# Patient Record
Sex: FEMALE | Race: WHITE | NOT HISPANIC OR LATINO
[De-identification: names, ages, dates, MRNs, and addresses within clinical notes are randomized per-mention and may not be internally consistent; named-entity substitution may affect disease eponyms.]

---

## 2017-07-27 ENCOUNTER — LABORATORY RESULT (OUTPATIENT)
Age: 82
End: 2017-07-27

## 2017-07-27 ENCOUNTER — APPOINTMENT (OUTPATIENT)
Dept: INTERNAL MEDICINE | Facility: CLINIC | Age: 82
End: 2017-07-27
Payer: MEDICARE

## 2017-07-27 VITALS
HEIGHT: 65 IN | BODY MASS INDEX: 25.99 KG/M2 | SYSTOLIC BLOOD PRESSURE: 126 MMHG | TEMPERATURE: 98.3 F | DIASTOLIC BLOOD PRESSURE: 72 MMHG | WEIGHT: 156 LBS

## 2017-07-27 VITALS — DIASTOLIC BLOOD PRESSURE: 66 MMHG | SYSTOLIC BLOOD PRESSURE: 116 MMHG

## 2017-07-27 DIAGNOSIS — Z82.3 FAMILY HISTORY OF STROKE: ICD-10-CM

## 2017-07-27 DIAGNOSIS — Z85.3 PERSONAL HISTORY OF MALIGNANT NEOPLASM OF BREAST: ICD-10-CM

## 2017-07-27 DIAGNOSIS — Z80.3 FAMILY HISTORY OF MALIGNANT NEOPLASM OF BREAST: ICD-10-CM

## 2017-07-27 DIAGNOSIS — G43.109 MIGRAINE WITH AURA, NOT INTRACTABLE, W/OUT STATUS MIGRAINOSUS: ICD-10-CM

## 2017-07-27 DIAGNOSIS — Z83.49 FAMILY HISTORY OF OTHER ENDOCRINE, NUTRITIONAL AND METABOLIC DISEASES: ICD-10-CM

## 2017-07-27 DIAGNOSIS — R41.3 OTHER AMNESIA: ICD-10-CM

## 2017-07-27 DIAGNOSIS — Z82.49 FAMILY HISTORY OF ISCHEMIC HEART DISEASE AND OTHER DISEASES OF THE CIRCULATORY SYSTEM: ICD-10-CM

## 2017-07-27 DIAGNOSIS — Z87.39 PERSONAL HISTORY OF OTHER DISEASES OF THE MUSCULOSKELETAL SYSTEM AND CONNECTIVE TISSUE: ICD-10-CM

## 2017-07-27 PROCEDURE — 99214 OFFICE O/P EST MOD 30 MIN: CPT | Mod: 25

## 2017-07-27 PROCEDURE — 36415 COLL VENOUS BLD VENIPUNCTURE: CPT

## 2017-07-28 LAB
25(OH)D3 SERPL-MCNC: 43.4 NG/ML
ALBUMIN SERPL ELPH-MCNC: 4.1 G/DL
ALP BLD-CCNC: 59 U/L
ALT SERPL-CCNC: 8 U/L
ANION GAP SERPL CALC-SCNC: 24 MMOL/L
AST SERPL-CCNC: 13 U/L
BASOPHILS # BLD AUTO: 0 K/UL
BASOPHILS NFR BLD AUTO: 0 %
BILIRUB SERPL-MCNC: 0.2 MG/DL
BUN SERPL-MCNC: 21 MG/DL
CALCIUM SERPL-MCNC: 10.6 MG/DL
CHLORIDE SERPL-SCNC: 98 MMOL/L
CHOLEST SERPL-MCNC: 162 MG/DL
CHOLEST/HDLC SERPL: 2.2 RATIO
CO2 SERPL-SCNC: 23 MMOL/L
CREAT SERPL-MCNC: 0.97 MG/DL
EOSINOPHIL # BLD AUTO: 0.1 K/UL
EOSINOPHIL NFR BLD AUTO: 1.7 %
GLUCOSE SERPL-MCNC: 52 MG/DL
HCT VFR BLD CALC: 44.4 %
HDLC SERPL-MCNC: 74 MG/DL
HGB BLD-MCNC: 13.9 G/DL
LDLC SERPL CALC-MCNC: 58 MG/DL
LYMPHOCYTES # BLD AUTO: 1.71 K/UL
LYMPHOCYTES NFR BLD AUTO: 28.4 %
MAN DIFF?: NORMAL
MCHC RBC-ENTMCNC: 31.3 GM/DL
MCHC RBC-ENTMCNC: 33.5 PG
MCV RBC AUTO: 107 FL
MONOCYTES # BLD AUTO: 0.47 K/UL
MONOCYTES NFR BLD AUTO: 7.8 %
NEUTROPHILS # BLD AUTO: 3.73 K/UL
NEUTROPHILS NFR BLD AUTO: 62.1 %
PLATELET # BLD AUTO: 232 K/UL
POTASSIUM SERPL-SCNC: 4.4 MMOL/L
PROT SERPL-MCNC: 7.2 G/DL
RBC # BLD: 4.15 M/UL
RBC # FLD: 13.7 %
SODIUM SERPL-SCNC: 145 MMOL/L
T3RU NFR SERPL: 1.1 INDEX
T4 FREE SERPL-MCNC: 1.1 NG/DL
T4 SERPL-MCNC: 6.5 UG/DL
TRIGL SERPL-MCNC: 152 MG/DL
TSH SERPL-ACNC: 0.81 UIU/ML
WBC # FLD AUTO: 6.01 K/UL

## 2017-08-28 ENCOUNTER — RX RENEWAL (OUTPATIENT)
Age: 82
End: 2017-08-28

## 2017-10-30 ENCOUNTER — APPOINTMENT (OUTPATIENT)
Dept: INTERNAL MEDICINE | Facility: CLINIC | Age: 82
End: 2017-10-30
Payer: MEDICARE

## 2017-10-30 ENCOUNTER — RX RENEWAL (OUTPATIENT)
Age: 82
End: 2017-10-30

## 2017-10-30 VITALS — DIASTOLIC BLOOD PRESSURE: 62 MMHG | SYSTOLIC BLOOD PRESSURE: 116 MMHG

## 2017-10-30 VITALS
TEMPERATURE: 98 F | SYSTOLIC BLOOD PRESSURE: 130 MMHG | WEIGHT: 157 LBS | BODY MASS INDEX: 26.16 KG/M2 | HEIGHT: 65 IN | DIASTOLIC BLOOD PRESSURE: 64 MMHG

## 2017-10-30 PROCEDURE — G0008: CPT

## 2017-10-30 PROCEDURE — 99212 OFFICE O/P EST SF 10 MIN: CPT | Mod: 25

## 2017-10-30 PROCEDURE — 90662 IIV NO PRSV INCREASED AG IM: CPT

## 2017-11-08 ENCOUNTER — FORM ENCOUNTER (OUTPATIENT)
Age: 82
End: 2017-11-08

## 2017-11-09 ENCOUNTER — OUTPATIENT (OUTPATIENT)
Dept: OUTPATIENT SERVICES | Facility: HOSPITAL | Age: 82
LOS: 1 days | End: 2017-11-09
Payer: MEDICARE

## 2017-11-09 ENCOUNTER — APPOINTMENT (OUTPATIENT)
Dept: ULTRASOUND IMAGING | Facility: CLINIC | Age: 82
End: 2017-11-09
Payer: MEDICARE

## 2017-11-09 ENCOUNTER — APPOINTMENT (OUTPATIENT)
Dept: MAMMOGRAPHY | Facility: CLINIC | Age: 82
End: 2017-11-09
Payer: MEDICARE

## 2017-11-09 DIAGNOSIS — Z00.8 ENCOUNTER FOR OTHER GENERAL EXAMINATION: ICD-10-CM

## 2017-11-09 PROCEDURE — 77063 BREAST TOMOSYNTHESIS BI: CPT | Mod: 26

## 2017-11-09 PROCEDURE — G0202: CPT | Mod: 26

## 2017-11-09 PROCEDURE — 76641 ULTRASOUND BREAST COMPLETE: CPT

## 2017-11-09 PROCEDURE — 76641 ULTRASOUND BREAST COMPLETE: CPT | Mod: 26,50

## 2017-11-09 PROCEDURE — 77063 BREAST TOMOSYNTHESIS BI: CPT

## 2017-11-09 PROCEDURE — 77067 SCR MAMMO BI INCL CAD: CPT

## 2017-11-13 ENCOUNTER — RX RENEWAL (OUTPATIENT)
Age: 82
End: 2017-11-13

## 2018-01-02 ENCOUNTER — RX RENEWAL (OUTPATIENT)
Age: 83
End: 2018-01-02

## 2018-01-16 ENCOUNTER — RX RENEWAL (OUTPATIENT)
Age: 83
End: 2018-01-16

## 2018-01-19 ENCOUNTER — RX RENEWAL (OUTPATIENT)
Age: 83
End: 2018-01-19

## 2018-02-06 ENCOUNTER — APPOINTMENT (OUTPATIENT)
Dept: INTERNAL MEDICINE | Facility: CLINIC | Age: 83
End: 2018-02-06
Payer: MEDICARE

## 2018-02-06 ENCOUNTER — LABORATORY RESULT (OUTPATIENT)
Age: 83
End: 2018-02-06

## 2018-02-06 VITALS
HEIGHT: 65 IN | DIASTOLIC BLOOD PRESSURE: 66 MMHG | SYSTOLIC BLOOD PRESSURE: 130 MMHG | TEMPERATURE: 97.6 F | BODY MASS INDEX: 26.16 KG/M2 | WEIGHT: 157 LBS

## 2018-02-06 VITALS — DIASTOLIC BLOOD PRESSURE: 60 MMHG | SYSTOLIC BLOOD PRESSURE: 144 MMHG

## 2018-02-06 PROCEDURE — 99214 OFFICE O/P EST MOD 30 MIN: CPT | Mod: 25

## 2018-02-06 PROCEDURE — 90670 PCV13 VACCINE IM: CPT

## 2018-02-06 PROCEDURE — 36415 COLL VENOUS BLD VENIPUNCTURE: CPT

## 2018-02-06 PROCEDURE — G0009: CPT

## 2018-02-06 RX ORDER — DOXYCYCLINE HYCLATE 100 MG/1
100 CAPSULE ORAL
Qty: 14 | Refills: 0 | Status: DISCONTINUED | COMMUNITY
Start: 2017-11-06 | End: 2018-02-06

## 2018-02-07 LAB
25(OH)D3 SERPL-MCNC: 60.4 NG/ML
ALBUMIN SERPL ELPH-MCNC: 4.1 G/DL
ALP BLD-CCNC: 67 U/L
ALT SERPL-CCNC: 14 U/L
ANION GAP SERPL CALC-SCNC: 13 MMOL/L
AST SERPL-CCNC: 19 U/L
BASOPHILS # BLD AUTO: 0.02 K/UL
BASOPHILS NFR BLD AUTO: 0.4 %
BILIRUB SERPL-MCNC: 0.4 MG/DL
BUN SERPL-MCNC: 13 MG/DL
CALCIUM SERPL-MCNC: 10.1 MG/DL
CHLORIDE SERPL-SCNC: 101 MMOL/L
CHOLEST SERPL-MCNC: 109 MG/DL
CHOLEST/HDLC SERPL: 1.7 RATIO
CK SERPL-CCNC: 90 U/L
CO2 SERPL-SCNC: 26 MMOL/L
CREAT SERPL-MCNC: 0.9 MG/DL
EOSINOPHIL # BLD AUTO: 0.06 K/UL
EOSINOPHIL NFR BLD AUTO: 1.1 %
GLUCOSE SERPL-MCNC: 101 MG/DL
HCT VFR BLD CALC: 43.8 %
HDLC SERPL-MCNC: 64 MG/DL
HGB BLD-MCNC: 14.3 G/DL
IMM GRANULOCYTES NFR BLD AUTO: 0 %
LDLC SERPL CALC-MCNC: 23 MG/DL
LYMPHOCYTES # BLD AUTO: 1.67 K/UL
LYMPHOCYTES NFR BLD AUTO: 30.5 %
MAN DIFF?: NORMAL
MCHC RBC-ENTMCNC: 32.6 GM/DL
MCHC RBC-ENTMCNC: 32.7 PG
MCV RBC AUTO: 100.2 FL
MONOCYTES # BLD AUTO: 0.55 K/UL
MONOCYTES NFR BLD AUTO: 10 %
NEUTROPHILS # BLD AUTO: 3.18 K/UL
NEUTROPHILS NFR BLD AUTO: 58 %
PLATELET # BLD AUTO: 232 K/UL
POTASSIUM SERPL-SCNC: 4 MMOL/L
PROT SERPL-MCNC: 7.4 G/DL
RBC # BLD: 4.37 M/UL
RBC # FLD: 12.7 %
SODIUM SERPL-SCNC: 140 MMOL/L
T3RU NFR SERPL: 1.08 INDEX
T4 FREE SERPL-MCNC: 1.2 NG/DL
T4 SERPL-MCNC: 7.2 UG/DL
TRIGL SERPL-MCNC: 112 MG/DL
TSH SERPL-ACNC: 1.12 UIU/ML
WBC # FLD AUTO: 5.48 K/UL

## 2018-02-13 ENCOUNTER — APPOINTMENT (OUTPATIENT)
Dept: INTERNAL MEDICINE | Facility: CLINIC | Age: 83
End: 2018-02-13
Payer: MEDICARE

## 2018-02-13 VITALS
HEIGHT: 65 IN | SYSTOLIC BLOOD PRESSURE: 122 MMHG | DIASTOLIC BLOOD PRESSURE: 70 MMHG | TEMPERATURE: 100.5 F | BODY MASS INDEX: 25.66 KG/M2 | WEIGHT: 154 LBS

## 2018-02-13 LAB
FLUAV SPEC QL CULT: POSITIVE
FLUBV AG SPEC QL IA: NEGATIVE

## 2018-02-13 PROCEDURE — 87804 INFLUENZA ASSAY W/OPTIC: CPT | Mod: QW

## 2018-02-13 PROCEDURE — 99213 OFFICE O/P EST LOW 20 MIN: CPT

## 2018-03-12 ENCOUNTER — RX RENEWAL (OUTPATIENT)
Age: 83
End: 2018-03-12

## 2018-03-27 ENCOUNTER — APPOINTMENT (OUTPATIENT)
Dept: INTERNAL MEDICINE | Facility: CLINIC | Age: 83
End: 2018-03-27
Payer: MEDICARE

## 2018-03-27 VITALS
WEIGHT: 155 LBS | SYSTOLIC BLOOD PRESSURE: 132 MMHG | BODY MASS INDEX: 25.83 KG/M2 | DIASTOLIC BLOOD PRESSURE: 70 MMHG | OXYGEN SATURATION: 97 % | HEIGHT: 65 IN | HEART RATE: 65 BPM | TEMPERATURE: 98.3 F

## 2018-03-27 DIAGNOSIS — Z86.59 PERSONAL HISTORY OF OTHER MENTAL AND BEHAVIORAL DISORDERS: ICD-10-CM

## 2018-03-27 DIAGNOSIS — Z87.09 PERSONAL HISTORY OF OTHER DISEASES OF THE RESPIRATORY SYSTEM: ICD-10-CM

## 2018-03-27 LAB
BILIRUB UR QL STRIP: NORMAL
CLARITY UR: CLEAR
COLLECTION METHOD: NORMAL
GLUCOSE UR-MCNC: NORMAL
HCG UR QL: 0.2 EU/DL
HGB UR QL STRIP.AUTO: ABNORMAL
KETONES UR-MCNC: NORMAL
LEUKOCYTE ESTERASE UR QL STRIP: NORMAL
NITRITE UR QL STRIP: NORMAL
PH UR STRIP: 6
PROT UR STRIP-MCNC: NORMAL
SP GR UR STRIP: 1.01

## 2018-03-27 PROCEDURE — 36415 COLL VENOUS BLD VENIPUNCTURE: CPT

## 2018-03-27 PROCEDURE — 82270 OCCULT BLOOD FECES: CPT

## 2018-03-27 PROCEDURE — G0439: CPT

## 2018-03-27 PROCEDURE — 81003 URINALYSIS AUTO W/O SCOPE: CPT | Mod: QW

## 2018-03-27 RX ORDER — OSELTAMIVIR PHOSPHATE 75 MG/1
75 CAPSULE ORAL TWICE DAILY
Qty: 10 | Refills: 0 | Status: DISCONTINUED | COMMUNITY
Start: 2018-02-13 | End: 2018-03-27

## 2018-03-27 RX ORDER — HYDROCHLOROTHIAZIDE 12.5 MG/1
12.5 TABLET ORAL
Qty: 90 | Refills: 1 | Status: DISCONTINUED | COMMUNITY
Start: 2017-07-27 | End: 2018-03-27

## 2018-04-04 ENCOUNTER — APPOINTMENT (OUTPATIENT)
Dept: INTERNAL MEDICINE | Facility: CLINIC | Age: 83
End: 2018-04-04
Payer: MEDICARE

## 2018-04-04 VITALS
TEMPERATURE: 98 F | OXYGEN SATURATION: 98 % | DIASTOLIC BLOOD PRESSURE: 50 MMHG | HEART RATE: 74 BPM | SYSTOLIC BLOOD PRESSURE: 120 MMHG | BODY MASS INDEX: 25.83 KG/M2 | HEIGHT: 65 IN | WEIGHT: 155 LBS

## 2018-04-04 VITALS — TEMPERATURE: 100.1 F | DIASTOLIC BLOOD PRESSURE: 52 MMHG | SYSTOLIC BLOOD PRESSURE: 120 MMHG

## 2018-04-04 PROCEDURE — 99213 OFFICE O/P EST LOW 20 MIN: CPT

## 2018-05-14 ENCOUNTER — RX RENEWAL (OUTPATIENT)
Age: 83
End: 2018-05-14

## 2018-06-26 ENCOUNTER — RX RENEWAL (OUTPATIENT)
Age: 83
End: 2018-06-26

## 2018-06-27 ENCOUNTER — APPOINTMENT (OUTPATIENT)
Dept: INTERNAL MEDICINE | Facility: CLINIC | Age: 83
End: 2018-06-27
Payer: MEDICARE

## 2018-06-27 VITALS — DIASTOLIC BLOOD PRESSURE: 84 MMHG | SYSTOLIC BLOOD PRESSURE: 152 MMHG

## 2018-06-27 VITALS
SYSTOLIC BLOOD PRESSURE: 160 MMHG | BODY MASS INDEX: 25.66 KG/M2 | TEMPERATURE: 98.3 F | HEIGHT: 65 IN | WEIGHT: 154 LBS | OXYGEN SATURATION: 96 % | DIASTOLIC BLOOD PRESSURE: 86 MMHG | HEART RATE: 72 BPM

## 2018-06-27 DIAGNOSIS — Z86.69 PERSONAL HISTORY OF OTHER DISEASES OF THE NERVOUS SYSTEM AND SENSE ORGANS: ICD-10-CM

## 2018-06-27 DIAGNOSIS — M79.671 PAIN IN RIGHT FOOT: ICD-10-CM

## 2018-06-27 DIAGNOSIS — H92.02 OTALGIA, LEFT EAR: ICD-10-CM

## 2018-06-27 PROCEDURE — 99213 OFFICE O/P EST LOW 20 MIN: CPT

## 2018-06-27 RX ORDER — ALPRAZOLAM 0.25 MG/1
0.25 TABLET ORAL EVERY 8 HOURS
Qty: 90 | Refills: 0 | Status: DISCONTINUED | COMMUNITY
Start: 2017-07-27 | End: 2018-06-27

## 2018-06-27 NOTE — ASSESSMENT
[FreeTextEntry1] : A 59-year-old female presented with blood in the left eye. Patient feels well without any eye discomfort, and she was noted to have blood in her left eye this morning. Her exam is consistent with some conjunctival hemorrhage. The rest of the exam was unremarkable. The patient has no visual disturbance or discomfort. I did reassure her that the hemorrhage will resolve over the next few days.\par \par Patient also asked if she can discontinue her diuretics, she is on a very small dose of furosemide, but that is causing urinary frequency. Patient has a history of hypertension, pulmonary hypertension and nonobstructive CAD. Her blood pressure was normotensive. She has no signs of dehydration. I advised her to continue with the diuretics.\par \par Patient also had complaint of pain in at one point in his abdomen for 2 days. On further questioning, she admitted to having the occasional constipation. Her abdominal exam was completely unremarkable, and the pain maybe from gas retention. Her pain has already resolved. I discussed with her about management of constipation, including taking stool softener MiraLax as needed.

## 2018-06-27 NOTE — PHYSICAL EXAM
[No Acute Distress] : no acute distress [Well Nourished] : well nourished [Well Developed] : well developed [PERRL] : pupils equal round and reactive to light [EOMI] : extraocular movements intact [Normal Oropharynx] : the oropharynx was normal [Normal TMs] : both tympanic membranes were normal [Normal Nasal Mucosa] : the nasal mucosa was normal [No JVD] : no jugular venous distention [Supple] : supple [No Respiratory Distress] : no respiratory distress  [Clear to Auscultation] : lungs were clear to auscultation bilaterally [Normal Rate] : normal rate  [Regular Rhythm] : with a regular rhythm [Normal S1, S2] : normal S1 and S2 [No Edema] : there was no peripheral edema [No Extremity Clubbing/Cyanosis] : no extremity clubbing/cyanosis [Soft] : abdomen soft [Non Tender] : non-tender [Non-distended] : non-distended [No Masses] : no abdominal mass palpated [Normal Bowel Sounds] : normal bowel sounds [Normal Supraclavicular Nodes] : no supraclavicular lymphadenopathy [Normal Posterior Cervical Nodes] : no posterior cervical lymphadenopathy [Normal Anterior Cervical Nodes] : no anterior cervical lymphadenopathy [No CVA Tenderness] : no CVA  tenderness [No Spinal Tenderness] : no spinal tenderness [No Joint Swelling] : no joint swelling [Grossly Normal Strength/Tone] : grossly normal strength/tone [de-identified] : Elderly female, [de-identified] : Pt has erythema on L sclera, but conjunctiva was not injected, no blood noted on fundoscipical exam,

## 2018-06-27 NOTE — HISTORY OF PRESENT ILLNESS
[Family Member] : family member [de-identified] : Pt presented for redness in L eye since this morning. There was no pain or change of vision.  She always has some floaters that hasn't changed.  Her daughter thinks she did not sleep well last night.  Pt has occ cough to clear her throat and that hasn't changed from baseline.\par \par She questioned wether she can discontinue her diuretics as that makes her void frequently.\par \par She also c/o of l sided abdominal pain for 2 days in one spot.  On questioning, she acknowledged that she was constipated around that time.

## 2018-07-13 ENCOUNTER — OTHER (OUTPATIENT)
Age: 83
End: 2018-07-13

## 2018-07-18 ENCOUNTER — APPOINTMENT (OUTPATIENT)
Dept: ULTRASOUND IMAGING | Facility: CLINIC | Age: 83
End: 2018-07-18

## 2018-08-07 ENCOUNTER — NON-APPOINTMENT (OUTPATIENT)
Age: 83
End: 2018-08-07

## 2018-08-07 ENCOUNTER — LABORATORY RESULT (OUTPATIENT)
Age: 83
End: 2018-08-07

## 2018-08-07 ENCOUNTER — APPOINTMENT (OUTPATIENT)
Dept: INTERNAL MEDICINE | Facility: CLINIC | Age: 83
End: 2018-08-07
Payer: MEDICARE

## 2018-08-07 VITALS
OXYGEN SATURATION: 97 % | SYSTOLIC BLOOD PRESSURE: 144 MMHG | HEART RATE: 67 BPM | WEIGHT: 152 LBS | HEIGHT: 65 IN | BODY MASS INDEX: 25.33 KG/M2 | TEMPERATURE: 98.4 F | DIASTOLIC BLOOD PRESSURE: 70 MMHG

## 2018-08-07 VITALS — DIASTOLIC BLOOD PRESSURE: 70 MMHG | SYSTOLIC BLOOD PRESSURE: 142 MMHG

## 2018-08-07 DIAGNOSIS — H11.32 CONJUNCTIVAL HEMORRHAGE, LEFT EYE: ICD-10-CM

## 2018-08-07 PROCEDURE — 99214 OFFICE O/P EST MOD 30 MIN: CPT | Mod: 25

## 2018-08-07 PROCEDURE — 36415 COLL VENOUS BLD VENIPUNCTURE: CPT

## 2018-08-07 PROCEDURE — 93000 ELECTROCARDIOGRAM COMPLETE: CPT

## 2018-08-07 RX ORDER — ALPRAZOLAM 0.25 MG/1
0.25 TABLET ORAL
Qty: 90 | Refills: 0 | Status: DISCONTINUED | COMMUNITY
Start: 2018-01-19 | End: 2018-08-07

## 2018-08-07 RX ORDER — CIPROFLOXACIN AND DEXAMETHASONE 3; 1 MG/ML; MG/ML
0.3-0.1 SUSPENSION/ DROPS AURICULAR (OTIC) TWICE DAILY
Qty: 1 | Refills: 0 | Status: DISCONTINUED | COMMUNITY
Start: 2018-04-04 | End: 2018-08-07

## 2018-08-08 PROBLEM — H11.32 SUBCONJUNCTIVAL HEMORRHAGE OF LEFT EYE: Status: RESOLVED | Noted: 2018-06-27 | Resolved: 2018-08-08

## 2018-08-08 LAB
ALBUMIN SERPL ELPH-MCNC: 4.3 G/DL
ALP BLD-CCNC: 65 U/L
ALT SERPL-CCNC: 9 U/L
ANION GAP SERPL CALC-SCNC: 12 MMOL/L
AST SERPL-CCNC: 16 U/L
BASOPHILS # BLD AUTO: 0.03 K/UL
BASOPHILS NFR BLD AUTO: 0.5 %
BILIRUB SERPL-MCNC: 0.3 MG/DL
BUN SERPL-MCNC: 16 MG/DL
CALCIUM SERPL-MCNC: 10.4 MG/DL
CHLORIDE SERPL-SCNC: 103 MMOL/L
CHOLEST SERPL-MCNC: 148 MG/DL
CHOLEST/HDLC SERPL: 2 RATIO
CK SERPL-CCNC: 92 U/L
CO2 SERPL-SCNC: 26 MMOL/L
CREAT SERPL-MCNC: 0.72 MG/DL
EOSINOPHIL # BLD AUTO: 0.05 K/UL
EOSINOPHIL NFR BLD AUTO: 0.8 %
FOLATE SERPL-MCNC: >20 NG/ML
GLUCOSE SERPL-MCNC: 84 MG/DL
HCT VFR BLD CALC: 44.8 %
HDLC SERPL-MCNC: 75 MG/DL
HGB BLD-MCNC: 14.2 G/DL
IMM GRANULOCYTES NFR BLD AUTO: 0.2 %
LDLC SERPL CALC-MCNC: 54 MG/DL
LYMPHOCYTES # BLD AUTO: 1.71 K/UL
LYMPHOCYTES NFR BLD AUTO: 26.2 %
MAN DIFF?: NORMAL
MCHC RBC-ENTMCNC: 31.7 GM/DL
MCHC RBC-ENTMCNC: 32 PG
MCV RBC AUTO: 100.9 FL
MONOCYTES # BLD AUTO: 0.76 K/UL
MONOCYTES NFR BLD AUTO: 11.7 %
NEUTROPHILS # BLD AUTO: 3.96 K/UL
NEUTROPHILS NFR BLD AUTO: 60.6 %
PLATELET # BLD AUTO: 260 K/UL
POTASSIUM SERPL-SCNC: 4.4 MMOL/L
PROT SERPL-MCNC: 7.3 G/DL
RBC # BLD: 4.44 M/UL
RBC # FLD: 12.6 %
SODIUM SERPL-SCNC: 141 MMOL/L
T3RU NFR SERPL: 1.11 INDEX
T4 FREE SERPL-MCNC: 1.3 NG/DL
T4 SERPL-MCNC: 6.7 UG/DL
TRIGL SERPL-MCNC: 97 MG/DL
TSH SERPL-ACNC: 0.66 UIU/ML
VIT B12 SERPL-MCNC: 606 PG/ML
WBC # FLD AUTO: 6.52 K/UL

## 2018-08-08 NOTE — PHYSICAL EXAM
[No Acute Distress] : no acute distress [Well Nourished] : well nourished [Well Developed] : well developed [Normal Sclera/Conjunctiva] : normal sclera/conjunctiva [PERRL] : pupils equal round and reactive to light [EOMI] : extraocular movements intact [Normal Oropharynx] : the oropharynx was normal [Normal TMs] : both tympanic membranes were normal [No JVD] : no jugular venous distention [Supple] : supple [No Respiratory Distress] : no respiratory distress  [Clear to Auscultation] : lungs were clear to auscultation bilaterally [Normal Rate] : normal rate  [Regular Rhythm] : with a regular rhythm [Normal S1, S2] : normal S1 and S2 [No Edema] : there was no peripheral edema [No Extremity Clubbing/Cyanosis] : no extremity clubbing/cyanosis [Soft] : abdomen soft [Non Tender] : non-tender [Non-distended] : non-distended [Normal Bowel Sounds] : normal bowel sounds [Normal Supraclavicular Nodes] : no supraclavicular lymphadenopathy [Normal Posterior Cervical Nodes] : no posterior cervical lymphadenopathy [Normal Anterior Cervical Nodes] : no anterior cervical lymphadenopathy [No CVA Tenderness] : no CVA  tenderness [No Spinal Tenderness] : no spinal tenderness [No Joint Swelling] : no joint swelling [Grossly Normal Strength/Tone] : grossly normal strength/tone [de-identified] : Elderly female,  [de-identified] : No sinus tenderness, mild nasal turbinate congestion noted.

## 2018-08-08 NOTE — ASSESSMENT
[FreeTextEntry1] : 89-year-old female with past medical history significant for hypertension, pulmonary hypertension, nonobstructive CAD and an episode of paroxysmal A. fib several years ago, presented with an episode today of feeling shaky, heart palpitations and feeling exhausted. This occurred twice he was having breakfast, and it lasted about 2 hours. Patient actually feels better now but was concerned about what happened. Her exam was pretty much unremarkable. Her EKG did not show any evidence of arrhythmia. Patient is actually scheduled to have a repeat echocardiogram next week. I will have some labs done to rule out chemical imbalance. The patient should also followup with cardiologist to discuss the above.  I will not adjust her medication at present.

## 2018-08-08 NOTE — HISTORY OF PRESENT ILLNESS
[FreeTextEntry8] : The patient had an episode after breakfast this morning of hands shaking, heart pounding, SOB, lightheaded, fatigue.  The episode lasted a couple of hours.  She didn't sleep well last night and couldn't fall back asleep, it took a long time before she was able to sleep.  She's had similar episodes off & on for for about 6 months.  They are usually of shorter duration, and less severe.  It occurred once every month or 2. \par \par She had coffee, and she wasn't sure if she had regular coffee or decaf coffee.  She took medications at her usual time.  She denied recent illness, F/S/C, URI sxs, N/V/D, or change of appetite.  \par \par She feels better now, her ears are congested.  \par \par The dose of Lipitor was lowered from 10 mg daily to QOD.

## 2018-08-20 ENCOUNTER — RX RENEWAL (OUTPATIENT)
Age: 83
End: 2018-08-20

## 2018-10-09 ENCOUNTER — APPOINTMENT (OUTPATIENT)
Dept: INTERNAL MEDICINE | Facility: CLINIC | Age: 83
End: 2018-10-09
Payer: MEDICARE

## 2018-10-09 VITALS
OXYGEN SATURATION: 96 % | HEIGHT: 65 IN | BODY MASS INDEX: 25.49 KG/M2 | HEART RATE: 60 BPM | SYSTOLIC BLOOD PRESSURE: 120 MMHG | WEIGHT: 153 LBS | TEMPERATURE: 97.9 F | DIASTOLIC BLOOD PRESSURE: 70 MMHG

## 2018-10-09 VITALS — DIASTOLIC BLOOD PRESSURE: 76 MMHG | SYSTOLIC BLOOD PRESSURE: 136 MMHG

## 2018-10-09 DIAGNOSIS — Z23 ENCOUNTER FOR IMMUNIZATION: ICD-10-CM

## 2018-10-09 PROCEDURE — 99213 OFFICE O/P EST LOW 20 MIN: CPT | Mod: 25

## 2018-10-09 PROCEDURE — G0008: CPT

## 2018-10-09 PROCEDURE — 90662 IIV NO PRSV INCREASED AG IM: CPT

## 2018-10-09 NOTE — PHYSICAL EXAM
[No Respiratory Distress] : no respiratory distress  [Clear to Auscultation] : lungs were clear to auscultation bilaterally [Normal Rate] : normal rate  [Regular Rhythm] : with a regular rhythm [Normal S1, S2] : normal S1 and S2 [Soft] : abdomen soft [Non Tender] : non-tender [Normal Bowel Sounds] : normal bowel sounds [No Joint Swelling] : no joint swelling [Grossly Normal Strength/Tone] : grossly normal strength/tone [de-identified] : elderly female,

## 2018-10-09 NOTE — HISTORY OF PRESENT ILLNESS
[de-identified] : Pt presented for Flu vaccine.  She feels well without any new complaint.\par \par She would like to have refill of her Xanax, she takes about 1/2 tab at bedtime.  She sleeps well, she is still taking Zoloft and doing well.

## 2018-10-24 ENCOUNTER — RX RENEWAL (OUTPATIENT)
Age: 83
End: 2018-10-24

## 2018-11-06 ENCOUNTER — RX RENEWAL (OUTPATIENT)
Age: 83
End: 2018-11-06

## 2018-11-12 ENCOUNTER — FORM ENCOUNTER (OUTPATIENT)
Age: 83
End: 2018-11-12

## 2018-11-13 ENCOUNTER — APPOINTMENT (OUTPATIENT)
Dept: MAMMOGRAPHY | Facility: CLINIC | Age: 83
End: 2018-11-13
Payer: MEDICARE

## 2018-11-13 ENCOUNTER — OUTPATIENT (OUTPATIENT)
Dept: OUTPATIENT SERVICES | Facility: HOSPITAL | Age: 83
LOS: 1 days | End: 2018-11-13
Payer: MEDICARE

## 2018-11-13 ENCOUNTER — APPOINTMENT (OUTPATIENT)
Dept: ULTRASOUND IMAGING | Facility: CLINIC | Age: 83
End: 2018-11-13
Payer: MEDICARE

## 2018-11-13 ENCOUNTER — APPOINTMENT (OUTPATIENT)
Dept: RADIOLOGY | Facility: CLINIC | Age: 83
End: 2018-11-13
Payer: MEDICARE

## 2018-11-13 DIAGNOSIS — Z00.8 ENCOUNTER FOR OTHER GENERAL EXAMINATION: ICD-10-CM

## 2018-11-13 DIAGNOSIS — Z85.3 PERSONAL HISTORY OF MALIGNANT NEOPLASM OF BREAST: ICD-10-CM

## 2018-11-13 DIAGNOSIS — Z87.39 PERSONAL HISTORY OF OTHER DISEASES OF THE MUSCULOSKELETAL SYSTEM AND CONNECTIVE TISSUE: ICD-10-CM

## 2018-11-13 PROCEDURE — 77067 SCR MAMMO BI INCL CAD: CPT

## 2018-11-13 PROCEDURE — 77080 DXA BONE DENSITY AXIAL: CPT | Mod: 26

## 2018-11-13 PROCEDURE — 76641 ULTRASOUND BREAST COMPLETE: CPT | Mod: 26,50

## 2018-11-13 PROCEDURE — 77067 SCR MAMMO BI INCL CAD: CPT | Mod: 26

## 2018-11-13 PROCEDURE — 77063 BREAST TOMOSYNTHESIS BI: CPT

## 2018-11-13 PROCEDURE — 77063 BREAST TOMOSYNTHESIS BI: CPT | Mod: 26

## 2018-11-13 PROCEDURE — 76641 ULTRASOUND BREAST COMPLETE: CPT

## 2018-11-13 PROCEDURE — 77080 DXA BONE DENSITY AXIAL: CPT

## 2018-11-14 ENCOUNTER — APPOINTMENT (OUTPATIENT)
Dept: INTERNAL MEDICINE | Facility: CLINIC | Age: 83
End: 2018-11-14
Payer: MEDICARE

## 2018-11-14 VITALS
WEIGHT: 154 LBS | OXYGEN SATURATION: 97 % | HEIGHT: 65 IN | HEART RATE: 61 BPM | SYSTOLIC BLOOD PRESSURE: 126 MMHG | BODY MASS INDEX: 25.66 KG/M2 | TEMPERATURE: 97.8 F | DIASTOLIC BLOOD PRESSURE: 60 MMHG

## 2018-11-14 VITALS — SYSTOLIC BLOOD PRESSURE: 138 MMHG | DIASTOLIC BLOOD PRESSURE: 62 MMHG

## 2018-11-14 PROCEDURE — 99213 OFFICE O/P EST LOW 20 MIN: CPT

## 2018-11-14 NOTE — HISTORY OF PRESENT ILLNESS
[FreeTextEntry8] : Pt presented with 2 weeks of urinary frequency and urgency.   She denied any dysuria, or change of color/odor.   There was no new medication.  No change of diet.  She F/S/C, N/V. change of appetite, back pain.

## 2018-11-14 NOTE — PHYSICAL EXAM
[No Acute Distress] : no acute distress [Well Nourished] : well nourished [Well Developed] : well developed [No Respiratory Distress] : no respiratory distress  [Clear to Auscultation] : lungs were clear to auscultation bilaterally [Normal Rate] : normal rate  [Regular Rhythm] : with a regular rhythm [Normal S1, S2] : normal S1 and S2 [No Edema] : there was no peripheral edema [No Extremity Clubbing/Cyanosis] : no extremity clubbing/cyanosis [Soft] : abdomen soft [Non Tender] : non-tender [Normal Bowel Sounds] : normal bowel sounds [No CVA Tenderness] : no CVA  tenderness [No Spinal Tenderness] : no spinal tenderness [No Joint Swelling] : no joint swelling [Grossly Normal Strength/Tone] : grossly normal strength/tone [de-identified] : Elderly female,

## 2018-11-15 LAB
APPEARANCE: CLEAR
BACTERIA: NEGATIVE
BILIRUBIN URINE: NEGATIVE
BLOOD URINE: NEGATIVE
COLOR: YELLOW
GLUCOSE QUALITATIVE U: NEGATIVE MG/DL
HYALINE CASTS: 2 /LPF
KETONES URINE: NEGATIVE
LEUKOCYTE ESTERASE URINE: NEGATIVE
MICROSCOPIC-UA: NORMAL
NITRITE URINE: NEGATIVE
PH URINE: 6.5
PROTEIN URINE: NEGATIVE MG/DL
RED BLOOD CELLS URINE: 3 /HPF
SPECIFIC GRAVITY URINE: 1.02
SQUAMOUS EPITHELIAL CELLS: 1 /HPF
UROBILINOGEN URINE: NEGATIVE MG/DL
WHITE BLOOD CELLS URINE: 3 /HPF

## 2018-11-16 LAB — BACTERIA UR CULT: NORMAL

## 2019-03-04 ENCOUNTER — RX RENEWAL (OUTPATIENT)
Age: 84
End: 2019-03-04

## 2019-04-04 ENCOUNTER — APPOINTMENT (OUTPATIENT)
Dept: INTERNAL MEDICINE | Facility: CLINIC | Age: 84
End: 2019-04-04
Payer: MEDICARE

## 2019-04-04 ENCOUNTER — NON-APPOINTMENT (OUTPATIENT)
Age: 84
End: 2019-04-04

## 2019-04-04 VITALS
WEIGHT: 155 LBS | HEART RATE: 65 BPM | DIASTOLIC BLOOD PRESSURE: 72 MMHG | OXYGEN SATURATION: 95 % | BODY MASS INDEX: 25.83 KG/M2 | TEMPERATURE: 98.2 F | SYSTOLIC BLOOD PRESSURE: 130 MMHG | HEIGHT: 65 IN

## 2019-04-04 VITALS — SYSTOLIC BLOOD PRESSURE: 142 MMHG | DIASTOLIC BLOOD PRESSURE: 76 MMHG

## 2019-04-04 DIAGNOSIS — R82.90 UNSPECIFIED ABNORMAL FINDINGS IN URINE: ICD-10-CM

## 2019-04-04 DIAGNOSIS — M19.90 UNSPECIFIED OSTEOARTHRITIS, UNSPECIFIED SITE: ICD-10-CM

## 2019-04-04 LAB
BILIRUB UR QL STRIP: NEGATIVE
CLARITY UR: CLEAR
COLLECTION METHOD: NORMAL
GLUCOSE UR-MCNC: NEGATIVE
HCG UR QL: 0.2 EU/DL
HGB UR QL STRIP.AUTO: ABNORMAL
KETONES UR-MCNC: NEGATIVE
LEUKOCYTE ESTERASE UR QL STRIP: NEGATIVE
NITRITE UR QL STRIP: NEGATIVE
PH UR STRIP: 6
PROT UR STRIP-MCNC: NEGATIVE
SP GR UR STRIP: >1.03

## 2019-04-04 PROCEDURE — 81003 URINALYSIS AUTO W/O SCOPE: CPT | Mod: QW

## 2019-04-04 PROCEDURE — 93000 ELECTROCARDIOGRAM COMPLETE: CPT

## 2019-04-04 PROCEDURE — 82270 OCCULT BLOOD FECES: CPT

## 2019-04-04 PROCEDURE — G0439: CPT

## 2019-04-04 RX ORDER — ALPRAZOLAM 0.25 MG/1
0.25 TABLET ORAL
Qty: 90 | Refills: 0 | Status: COMPLETED | COMMUNITY
Start: 2018-06-26 | End: 2019-04-04

## 2019-04-04 RX ORDER — RISEDRONATE SODIUM 35 MG/1
35 TABLET, FILM COATED ORAL
Qty: 12 | Refills: 3 | Status: COMPLETED | COMMUNITY
Start: 2018-11-14 | End: 2019-04-04

## 2019-04-04 RX ORDER — AMOXICILLIN 500 MG/1
500 CAPSULE ORAL
Qty: 30 | Refills: 0 | Status: COMPLETED | COMMUNITY
Start: 2017-08-08 | End: 2019-04-04

## 2019-04-04 NOTE — REVIEW OF SYSTEMS
[Fatigue] : fatigue [Negative] : Heme/Lymph [Fever] : no fever [Chills] : no chills [Recent Change In Weight] : ~T no recent weight change [FreeTextEntry2] : As in HPI, [FreeTextEntry3] : wears reading glasses,

## 2019-04-04 NOTE — HISTORY OF PRESENT ILLNESS
[Other: _____] : [unfilled] [de-identified] : Pt presented for PE.  Last PE was 1 year ago.  \par \par Pt c/o some aches and pains, and a little more tired.  Her eye sight may be worse, and has apt with ophthal soon.  She feels a little lightheaded at times, not a dizzy sensation, but like her balance was not good.  Her ears feel closed up sometimes, and she feels uptight and anxious sometimes.\par \par She also has aches and pains on her feet, knees and hands.  It's difficult because she's not able to do all the things she wants to do.  She goes to exercise classes every day, but is very sedentary in the afternoon, and sits in her room most days.\par \par She noted the occ pain in her L abdomen, but that resolves on its own.  She noted that she is still constipated.

## 2019-04-04 NOTE — ASSESSMENT
[FreeTextEntry1] : Pt is UTD with mammogram, breast sonogram, and DEXA scan.  She does not wish for anymore Pap smear and colonoscopy.  She is UTD with eye exam, dental care and skin exam.

## 2019-04-04 NOTE — PHYSICAL EXAM
[No Acute Distress] : no acute distress [Well Nourished] : well nourished [Well Developed] : well developed [Normal Sclera/Conjunctiva] : normal sclera/conjunctiva [PERRL] : pupils equal round and reactive to light [EOMI] : extraocular movements intact [Normal Outer Ear/Nose] : the outer ears and nose were normal in appearance [Normal Oropharynx] : the oropharynx was normal [Normal TMs] : both tympanic membranes were normal [Normal Nasal Mucosa] : the nasal mucosa was normal [No JVD] : no jugular venous distention [Supple] : supple [No Lymphadenopathy] : no lymphadenopathy [No Respiratory Distress] : no respiratory distress  [Clear to Auscultation] : lungs were clear to auscultation bilaterally [Normal Rate] : normal rate  [Regular Rhythm] : with a regular rhythm [Normal S1, S2] : normal S1 and S2 [No Carotid Bruits] : no carotid bruits [Pedal Pulses Present] : the pedal pulses are present [No Edema] : there was no peripheral edema [No Extremity Clubbing/Cyanosis] : no extremity clubbing/cyanosis [Normal Appearance] : normal in appearance [No Masses] : no palpable masses [No Nipple Discharge] : no nipple discharge [No Axillary Lymphadenopathy] : no axillary lymphadenopathy [Soft] : abdomen soft [Non Tender] : non-tender [Non-distended] : non-distended [Normal Bowel Sounds] : normal bowel sounds [Normal Sphincter Tone] : normal sphincter tone [No Mass] : no mass [Normal Supraclavicular Nodes] : no supraclavicular lymphadenopathy [Normal Axillary Nodes] : no axillary lymphadenopathy [Normal Posterior Cervical Nodes] : no posterior cervical lymphadenopathy [Normal Anterior Cervical Nodes] : no anterior cervical lymphadenopathy [No CVA Tenderness] : no CVA  tenderness [No Spinal Tenderness] : no spinal tenderness [No Joint Swelling] : no joint swelling [Grossly Normal Strength/Tone] : grossly normal strength/tone [No Rash] : no rash [Normal Gait] : normal gait [Coordination Grossly Intact] : coordination grossly intact [No Focal Deficits] : no focal deficits [Speech Grossly Normal] : speech grossly normal [Normal Affect] : the affect was normal [Alert and Oriented x3] : oriented to person, place, and time [Normal Mood] : the mood was normal [Stool Occult Blood] : stool negative for occult blood [de-identified] : Elderly female,

## 2019-04-04 NOTE — COUNSELING
[Healthy eating counseling provided] : healthy eating [Activity counseling provided] : activity [Decrease Portions] : Decrease food portions [Walking] : Walking

## 2019-04-08 ENCOUNTER — RX RENEWAL (OUTPATIENT)
Age: 84
End: 2019-04-08

## 2019-04-08 LAB
APPEARANCE: CLEAR
BACTERIA UR CULT: NORMAL
BACTERIA: NEGATIVE
BILIRUBIN URINE: NEGATIVE
BLOOD URINE: NEGATIVE
COLOR: YELLOW
GLUCOSE QUALITATIVE U: NEGATIVE
HYALINE CASTS: 2 /LPF
KETONES URINE: NEGATIVE
LEUKOCYTE ESTERASE URINE: NEGATIVE
MICROSCOPIC-UA: NORMAL
NITRITE URINE: NEGATIVE
PH URINE: 5.5
PROTEIN URINE: NEGATIVE
RED BLOOD CELLS URINE: 4 /HPF
SPECIFIC GRAVITY URINE: 1.02
SQUAMOUS EPITHELIAL CELLS: 1 /HPF
UROBILINOGEN URINE: NORMAL
WHITE BLOOD CELLS URINE: 2 /HPF

## 2019-05-21 ENCOUNTER — APPOINTMENT (OUTPATIENT)
Dept: INTERNAL MEDICINE | Facility: CLINIC | Age: 84
End: 2019-05-21
Payer: MEDICARE

## 2019-05-21 VITALS
BODY MASS INDEX: 25.66 KG/M2 | DIASTOLIC BLOOD PRESSURE: 66 MMHG | HEART RATE: 73 BPM | TEMPERATURE: 97.9 F | HEIGHT: 65 IN | OXYGEN SATURATION: 97 % | SYSTOLIC BLOOD PRESSURE: 140 MMHG | WEIGHT: 154 LBS

## 2019-05-21 VITALS — DIASTOLIC BLOOD PRESSURE: 60 MMHG | SYSTOLIC BLOOD PRESSURE: 112 MMHG | TEMPERATURE: 99.8 F

## 2019-05-21 DIAGNOSIS — N39.0 URINARY TRACT INFECTION, SITE NOT SPECIFIED: ICD-10-CM

## 2019-05-21 LAB
BILIRUB UR QL STRIP: NEGATIVE
CLARITY UR: CLEAR
COLLECTION METHOD: NORMAL
GLUCOSE UR-MCNC: NEGATIVE
HCG UR QL: 0.2 EU/DL
HGB UR QL STRIP.AUTO: ABNORMAL
KETONES UR-MCNC: ABNORMAL
LEUKOCYTE ESTERASE UR QL STRIP: NEGATIVE
NITRITE UR QL STRIP: NEGATIVE
PH UR STRIP: 5
PROT UR STRIP-MCNC: NEGATIVE
SP GR UR STRIP: 1.02

## 2019-05-21 PROCEDURE — 81003 URINALYSIS AUTO W/O SCOPE: CPT | Mod: QW

## 2019-05-21 PROCEDURE — 99213 OFFICE O/P EST LOW 20 MIN: CPT | Mod: 25

## 2019-05-21 NOTE — PHYSICAL EXAM
[No Acute Distress] : no acute distress [Well Nourished] : well nourished [Well Developed] : well developed [No Respiratory Distress] : no respiratory distress  [Clear to Auscultation] : lungs were clear to auscultation bilaterally [Normal Rate] : normal rate  [Regular Rhythm] : with a regular rhythm [Normal S1, S2] : normal S1 and S2 [No Edema] : there was no peripheral edema [No Extremity Clubbing/Cyanosis] : no extremity clubbing/cyanosis [Soft] : abdomen soft [Non Tender] : non-tender [Normal Bowel Sounds] : normal bowel sounds [No CVA Tenderness] : no CVA  tenderness [No Spinal Tenderness] : no spinal tenderness [No Joint Swelling] : no joint swelling [Grossly Normal Strength/Tone] : grossly normal strength/tone [de-identified] : Elderly female,

## 2019-05-21 NOTE — HISTORY OF PRESENT ILLNESS
[FreeTextEntry8] : Pt c/o 1 1/2 weeks of urinary incontinence and frequency.  There was no fever or chill, N/V, back pain.  There's no dysuria or hematuria.  Appetite was unchanged, but may be a little more tired than usual.

## 2019-05-23 LAB
APPEARANCE: CLEAR
BACTERIA UR CULT: NORMAL
BACTERIA: ABNORMAL
BILIRUBIN URINE: NEGATIVE
BLOOD URINE: NEGATIVE
COLOR: YELLOW
GLUCOSE QUALITATIVE U: NEGATIVE
HYALINE CASTS: 0 /LPF
KETONES URINE: NEGATIVE
LEUKOCYTE ESTERASE URINE: ABNORMAL
MICROSCOPIC-UA: NORMAL
NITRITE URINE: NEGATIVE
PH URINE: 6
PROTEIN URINE: NORMAL
RED BLOOD CELLS URINE: 2 /HPF
SPECIFIC GRAVITY URINE: 1.03
SQUAMOUS EPITHELIAL CELLS: 1 /HPF
UROBILINOGEN URINE: NORMAL
WHITE BLOOD CELLS URINE: 8 /HPF

## 2019-05-28 ENCOUNTER — RX RENEWAL (OUTPATIENT)
Age: 84
End: 2019-05-28

## 2019-05-30 ENCOUNTER — RX RENEWAL (OUTPATIENT)
Age: 84
End: 2019-05-30

## 2019-08-12 ENCOUNTER — RX RENEWAL (OUTPATIENT)
Age: 84
End: 2019-08-12

## 2019-10-14 ENCOUNTER — RX RENEWAL (OUTPATIENT)
Age: 84
End: 2019-10-14

## 2019-10-16 ENCOUNTER — APPOINTMENT (OUTPATIENT)
Dept: INTERNAL MEDICINE | Facility: CLINIC | Age: 84
End: 2019-10-16
Payer: MEDICARE

## 2019-10-16 VITALS
BODY MASS INDEX: 25.83 KG/M2 | TEMPERATURE: 98 F | DIASTOLIC BLOOD PRESSURE: 90 MMHG | SYSTOLIC BLOOD PRESSURE: 150 MMHG | HEART RATE: 61 BPM | OXYGEN SATURATION: 96 % | WEIGHT: 155 LBS | HEIGHT: 65 IN

## 2019-10-16 DIAGNOSIS — J30.9 ALLERGIC RHINITIS, UNSPECIFIED: ICD-10-CM

## 2019-10-16 PROCEDURE — 99214 OFFICE O/P EST MOD 30 MIN: CPT | Mod: 25

## 2019-10-16 PROCEDURE — 36415 COLL VENOUS BLD VENIPUNCTURE: CPT

## 2019-10-16 RX ORDER — ALPRAZOLAM 0.25 MG/1
0.25 TABLET ORAL EVERY 8 HOURS
Qty: 90 | Refills: 0 | Status: COMPLETED | COMMUNITY
Start: 2018-10-09 | End: 2019-10-16

## 2019-10-16 RX ORDER — CEFUROXIME AXETIL 500 MG/1
500 TABLET ORAL
Qty: 14 | Refills: 0 | Status: COMPLETED | COMMUNITY
Start: 2019-05-21 | End: 2019-10-16

## 2019-10-16 RX ORDER — DILTIAZEM HYDROCHLORIDE 120 MG/1
120 CAPSULE, EXTENDED RELEASE ORAL
Qty: 180 | Refills: 3 | Status: COMPLETED | COMMUNITY
Start: 2017-07-27 | End: 2019-10-16

## 2019-10-16 NOTE — ASSESSMENT
[FreeTextEntry1] : Patient also complains of pain on her left foot with walking, which turned out not to be a pain but the sensation that her foot will come out.  This is a pair of new shoes, we discussed wearing socks of thicker to avoid that sensation.\par \par Patient is also asking for a flu vaccine, but our office does not have the vaccine available today.  Patient will either return later for the flu vaccine, or she may have it at an urgent care center.

## 2019-10-16 NOTE — HISTORY OF PRESENT ILLNESS
[de-identified] : Pt presented for f/u.\par \par She c/o of her L foot hurting.  It's new shoes, and she feels like it's going to come out when she walks, but it's actually quite secure, and there is not much pain.\par \par She has a cough in the morning, or if she talks for a while. \par \par She still has the occ bowel problem, the stool is hard and she has to strain.  It helps if she eats an apple before bed.\par \par She also needs refill of Xanax, she takes 1/2 tab of 0.25 mg QHS and it works well.\par \par She would like to have Flu vaccine, but that's not available today.

## 2019-10-16 NOTE — PHYSICAL EXAM
[No Acute Distress] : no acute distress [Well Nourished] : well nourished [Well Developed] : well developed [Normal Sclera/Conjunctiva] : normal sclera/conjunctiva [PERRL] : pupils equal round and reactive to light [EOMI] : extraocular movements intact [Normal Outer Ear/Nose] : the outer ears and nose were normal in appearance [Normal Oropharynx] : the oropharynx was normal [Normal TMs] : both tympanic membranes were normal [Normal Nasal Mucosa] : the nasal mucosa was normal [No Respiratory Distress] : no respiratory distress  [Clear to Auscultation] : lungs were clear to auscultation bilaterally [Normal Rate] : normal rate  [Regular Rhythm] : with a regular rhythm [Normal S1, S2] : normal S1 and S2 [No Edema] : there was no peripheral edema [Soft] : abdomen soft [Non Tender] : non-tender [Normal Bowel Sounds] : normal bowel sounds [No CVA Tenderness] : no CVA  tenderness [No Spinal Tenderness] : no spinal tenderness [Speech Grossly Normal] : speech grossly normal [Normal Affect] : the affect was normal [Alert and Oriented x3] : oriented to person, place, and time [Normal Mood] : the mood was normal [de-identified] : Elderly female,

## 2019-10-17 LAB
ALBUMIN SERPL ELPH-MCNC: 4.3 G/DL
ALP BLD-CCNC: 66 U/L
ALT SERPL-CCNC: 9 U/L
ANION GAP SERPL CALC-SCNC: 14 MMOL/L
AST SERPL-CCNC: 15 U/L
BILIRUB SERPL-MCNC: 0.2 MG/DL
BUN SERPL-MCNC: 20 MG/DL
CALCIUM SERPL-MCNC: 10.1 MG/DL
CHLORIDE SERPL-SCNC: 104 MMOL/L
CHOLEST SERPL-MCNC: 166 MG/DL
CHOLEST/HDLC SERPL: 2.1 RATIO
CK SERPL-CCNC: 86 U/L
CO2 SERPL-SCNC: 26 MMOL/L
CREAT SERPL-MCNC: 0.75 MG/DL
GLUCOSE SERPL-MCNC: 94 MG/DL
HDLC SERPL-MCNC: 79 MG/DL
LDLC SERPL CALC-MCNC: 61 MG/DL
POTASSIUM SERPL-SCNC: 4.2 MMOL/L
PROT SERPL-MCNC: 6.7 G/DL
SODIUM SERPL-SCNC: 143 MMOL/L
TRIGL SERPL-MCNC: 131 MG/DL

## 2019-10-22 ENCOUNTER — APPOINTMENT (OUTPATIENT)
Dept: INTERNAL MEDICINE | Facility: CLINIC | Age: 84
End: 2019-10-22
Payer: MEDICARE

## 2019-10-22 PROCEDURE — G0008: CPT

## 2019-10-22 PROCEDURE — 90662 IIV NO PRSV INCREASED AG IM: CPT

## 2019-10-29 ENCOUNTER — APPOINTMENT (OUTPATIENT)
Dept: INTERNAL MEDICINE | Facility: CLINIC | Age: 84
End: 2019-10-29
Payer: MEDICARE

## 2019-10-29 ENCOUNTER — NON-APPOINTMENT (OUTPATIENT)
Age: 84
End: 2019-10-29

## 2019-10-29 VITALS
WEIGHT: 156 LBS | DIASTOLIC BLOOD PRESSURE: 60 MMHG | BODY MASS INDEX: 25.99 KG/M2 | SYSTOLIC BLOOD PRESSURE: 140 MMHG | OXYGEN SATURATION: 97 % | HEIGHT: 65 IN | TEMPERATURE: 97.9 F | HEART RATE: 69 BPM

## 2019-10-29 VITALS — SYSTOLIC BLOOD PRESSURE: 140 MMHG | DIASTOLIC BLOOD PRESSURE: 72 MMHG | TEMPERATURE: 99.4 F

## 2019-10-29 DIAGNOSIS — R00.2 PALPITATIONS: ICD-10-CM

## 2019-10-29 PROCEDURE — 93000 ELECTROCARDIOGRAM COMPLETE: CPT

## 2019-10-29 PROCEDURE — 99213 OFFICE O/P EST LOW 20 MIN: CPT | Mod: 25

## 2019-10-29 NOTE — PHYSICAL EXAM
[No Acute Distress] : no acute distress [Well Nourished] : well nourished [Well Developed] : well developed [Normal Sclera/Conjunctiva] : normal sclera/conjunctiva [PERRL] : pupils equal round and reactive to light [EOMI] : extraocular movements intact [Normal Oropharynx] : the oropharynx was normal [Normal TMs] : both tympanic membranes were normal [Normal Nasal Mucosa] : the nasal mucosa was normal [No JVD] : no jugular venous distention [Supple] : supple [No Respiratory Distress] : no respiratory distress  [Clear to Auscultation] : lungs were clear to auscultation bilaterally [Normal Rate] : normal rate  [Regular Rhythm] : with a regular rhythm [Normal S1, S2] : normal S1 and S2 [Soft] : abdomen soft [Non Tender] : non-tender [Normal Bowel Sounds] : normal bowel sounds [No CVA Tenderness] : no CVA  tenderness [No Spinal Tenderness] : no spinal tenderness [No Joint Swelling] : no joint swelling [Grossly Normal Strength/Tone] : grossly normal strength/tone [de-identified] : Elderly female, slightly anxious [de-identified] : trace ankle edema on L, no calf tenderness,

## 2019-10-29 NOTE — HISTORY OF PRESENT ILLNESS
[Other: _____] : [unfilled] [FreeTextEntry8] : Pt presented for not feeling well for 1-2 days.  She noted some SOB with certain activities.  It's episodic.  She noted she was coughing yesterday in the mid morning and felt congested, she felt very tired, she thought her heart was racing, and she had some SOB.  The first episode lasted 45 minutes, she felt better when she went downstair to eat.   She still felt exhausted after brown.\par \par Several days ago, she missed her pills in the morning, she then took her meds late, but did eventually took all of her medications that day, she noted that she had some congestion and cough later that day.\par \par Today, she feels better, her voice is a little hoarse.  She has the occ cough only, with a little bit of clear phlegm, and she has to clear her throat.  No sore throat.  She had some HA yesterday.  No nasal congestion or rhinorrhea.  She felt a little warm, like she had a hot flashes.   No N/V/D/dizziness.   No CP.

## 2019-10-29 NOTE — ASSESSMENT
[FreeTextEntry1] : 89 y/o female with PMHx significant for PAF, HTN, nonobstructive CAD, Mitral stenosis, pulmonary HTN presented with not feeling well for 2 days.  She has a cough, some chest congestion, and an episode of heart racing and feeling exhausted afterward, moments of feeling warm, but did not take temperature, as well as hoarseness.  She actually felt worse yesterday, but better today.\par \par On exam, T - 99.4, VSS.  HEENT exam was unremarkable.  Lung and heart exam were normal.\par \par EKG - unremarkable.\par \par Pt likely has a viral URI with low grade fever, causing some tachycardia and fatigue.  I advised Tylenol, increase hydration, and an OTC antitussive such as Robitussin DM as needed for her cough.  RTO PRN if her condition worsens.  Hold off on ABx.\par \par Pt was advised that if she continues to have episodes of racing heart beat, then she should return for f/u or consult with cardiology.

## 2019-12-18 ENCOUNTER — FORM ENCOUNTER (OUTPATIENT)
Age: 84
End: 2019-12-18

## 2019-12-19 ENCOUNTER — OUTPATIENT (OUTPATIENT)
Dept: OUTPATIENT SERVICES | Facility: HOSPITAL | Age: 84
LOS: 1 days | End: 2019-12-19
Payer: MEDICARE

## 2019-12-19 ENCOUNTER — APPOINTMENT (OUTPATIENT)
Dept: ULTRASOUND IMAGING | Facility: CLINIC | Age: 84
End: 2019-12-19
Payer: MEDICARE

## 2019-12-19 ENCOUNTER — APPOINTMENT (OUTPATIENT)
Dept: MAMMOGRAPHY | Facility: CLINIC | Age: 84
End: 2019-12-19
Payer: MEDICARE

## 2019-12-19 DIAGNOSIS — Z00.8 ENCOUNTER FOR OTHER GENERAL EXAMINATION: ICD-10-CM

## 2019-12-19 PROCEDURE — 77067 SCR MAMMO BI INCL CAD: CPT | Mod: 26

## 2019-12-19 PROCEDURE — 77063 BREAST TOMOSYNTHESIS BI: CPT

## 2019-12-19 PROCEDURE — 76641 ULTRASOUND BREAST COMPLETE: CPT | Mod: 26,50

## 2019-12-19 PROCEDURE — 77063 BREAST TOMOSYNTHESIS BI: CPT | Mod: 26

## 2019-12-19 PROCEDURE — 76641 ULTRASOUND BREAST COMPLETE: CPT

## 2019-12-19 PROCEDURE — 77067 SCR MAMMO BI INCL CAD: CPT

## 2020-01-07 ENCOUNTER — RX RENEWAL (OUTPATIENT)
Age: 85
End: 2020-01-07

## 2020-02-10 ENCOUNTER — RX RENEWAL (OUTPATIENT)
Age: 85
End: 2020-02-10

## 2020-03-02 ENCOUNTER — RX RENEWAL (OUTPATIENT)
Age: 85
End: 2020-03-02

## 2020-03-11 ENCOUNTER — APPOINTMENT (OUTPATIENT)
Dept: INTERNAL MEDICINE | Facility: CLINIC | Age: 85
End: 2020-03-11

## 2020-05-13 ENCOUNTER — APPOINTMENT (OUTPATIENT)
Dept: INTERNAL MEDICINE | Facility: CLINIC | Age: 85
End: 2020-05-13
Payer: MEDICARE

## 2020-05-13 ENCOUNTER — RX RENEWAL (OUTPATIENT)
Age: 85
End: 2020-05-13

## 2020-05-13 PROCEDURE — 99442: CPT | Mod: CR

## 2020-06-02 ENCOUNTER — TRANSCRIPTION ENCOUNTER (OUTPATIENT)
Age: 85
End: 2020-06-02

## 2020-06-03 ENCOUNTER — TRANSCRIPTION ENCOUNTER (OUTPATIENT)
Age: 85
End: 2020-06-03

## 2020-07-06 ENCOUNTER — RX RENEWAL (OUTPATIENT)
Age: 85
End: 2020-07-06

## 2020-10-12 ENCOUNTER — NON-APPOINTMENT (OUTPATIENT)
Age: 85
End: 2020-10-12

## 2020-10-12 ENCOUNTER — LABORATORY RESULT (OUTPATIENT)
Age: 85
End: 2020-10-12

## 2020-10-12 ENCOUNTER — APPOINTMENT (OUTPATIENT)
Dept: INTERNAL MEDICINE | Facility: CLINIC | Age: 85
End: 2020-10-12
Payer: MEDICARE

## 2020-10-12 VITALS
OXYGEN SATURATION: 94 % | HEART RATE: 63 BPM | SYSTOLIC BLOOD PRESSURE: 162 MMHG | BODY MASS INDEX: 25.83 KG/M2 | HEIGHT: 65 IN | WEIGHT: 155 LBS | DIASTOLIC BLOOD PRESSURE: 72 MMHG | TEMPERATURE: 98.1 F

## 2020-10-12 VITALS — SYSTOLIC BLOOD PRESSURE: 140 MMHG | DIASTOLIC BLOOD PRESSURE: 70 MMHG

## 2020-10-12 DIAGNOSIS — Z87.898 PERSONAL HISTORY OF OTHER SPECIFIED CONDITIONS: ICD-10-CM

## 2020-10-12 DIAGNOSIS — I05.0 RHEUMATIC MITRAL STENOSIS: ICD-10-CM

## 2020-10-12 DIAGNOSIS — J06.9 ACUTE UPPER RESPIRATORY INFECTION, UNSPECIFIED: ICD-10-CM

## 2020-10-12 DIAGNOSIS — M81.0 AGE-RELATED OSTEOPOROSIS W/OUT CURRENT PATHOLOGICAL FRACTURE: ICD-10-CM

## 2020-10-12 DIAGNOSIS — I25.10 ATHEROSCLEROTIC HEART DISEASE OF NATIVE CORONARY ARTERY W/OUT ANGINA PECTORIS: ICD-10-CM

## 2020-10-12 PROCEDURE — 93000 ELECTROCARDIOGRAM COMPLETE: CPT | Mod: 59

## 2020-10-12 PROCEDURE — 82270 OCCULT BLOOD FECES: CPT

## 2020-10-12 PROCEDURE — 36415 COLL VENOUS BLD VENIPUNCTURE: CPT

## 2020-10-12 PROCEDURE — G0439: CPT

## 2020-10-12 PROCEDURE — G0444 DEPRESSION SCREEN ANNUAL: CPT | Mod: 59

## 2020-10-12 NOTE — HEALTH RISK ASSESSMENT
[With Patient/Caregiver] : With Patient/Caregiver [Relationship: ___] : Relationship: [unfilled] [Never (0 pts)] : Never (0 points) [No] : In the past 12 months have you used drugs other than those required for medical reasons? No [No falls in past year] : Patient reported no falls in the past year [0] : 2) Feeling down, depressed, or hopeless: Not at all (0) [Assistive Device] : Patient uses an assistive device [] : No [Audit-CScore] : 0 [de-identified] : goes to exercise classes at the assisted living facility, and does some walking, [de-identified] : Uses a cane, [[id number]] : 0 [EyeExamDate] : 11/19 [MammogramDate] : 12/19 [MammogramComments] : US breast - 12/19 [PapSmearDate] : >5 years ago [BoneDensityDate] : 11/18 [ColonoscopyDate] : 01/06 [de-identified] : dentist - 01/20 [AdvancecareDate] : 10/20

## 2020-10-12 NOTE — ASSESSMENT
[FreeTextEntry1] : Pt will go for mammogram, breast sonogram and DEXA scan when the coronavirus is under control, she is due in 2 months.  She was reminded to have routine eye exam, dental care and skin exam with dermatologist.  I will check [facility] labs.

## 2020-10-12 NOTE — PHYSICAL EXAM
[No Acute Distress] : no acute distress [Well Nourished] : well nourished [Well Developed] : well developed [Normal Sclera/Conjunctiva] : normal sclera/conjunctiva [PERRL] : pupils equal round and reactive to light [EOMI] : extraocular movements intact [Normal Outer Ear/Nose] : the outer ears and nose were normal in appearance [Normal Oropharynx] : the oropharynx was normal [Normal TMs] : both tympanic membranes were normal [Normal Nasal Mucosa] : the nasal mucosa was normal [No JVD] : no jugular venous distention [No Lymphadenopathy] : no lymphadenopathy [Supple] : supple [No Respiratory Distress] : no respiratory distress  [Clear to Auscultation] : lungs were clear to auscultation bilaterally [Normal Rate] : normal rate  [Regular Rhythm] : with a regular rhythm [Normal S1, S2] : normal S1 and S2 [No Carotid Bruits] : no carotid bruits [Pedal Pulses Present] : the pedal pulses are present [No Edema] : there was no peripheral edema [No Extremity Clubbing/Cyanosis] : no extremity clubbing/cyanosis [No Masses] : no palpable masses [No Nipple Discharge] : no nipple discharge [No Axillary Lymphadenopathy] : no axillary lymphadenopathy [Soft] : abdomen soft [Non Tender] : non-tender [Non-distended] : non-distended [Normal Bowel Sounds] : normal bowel sounds [Normal Sphincter Tone] : normal sphincter tone [No Mass] : no mass [Normal Supraclavicular Nodes] : no supraclavicular lymphadenopathy [Normal Axillary Nodes] : no axillary lymphadenopathy [Normal Posterior Cervical Nodes] : no posterior cervical lymphadenopathy [Normal Anterior Cervical Nodes] : no anterior cervical lymphadenopathy [No CVA Tenderness] : no CVA  tenderness [No Spinal Tenderness] : no spinal tenderness [No Joint Swelling] : no joint swelling [Grossly Normal Strength/Tone] : grossly normal strength/tone [No Rash] : no rash [Coordination Grossly Intact] : coordination grossly intact [No Focal Deficits] : no focal deficits [Speech Grossly Normal] : speech grossly normal [Normal Affect] : the affect was normal [Alert and Oriented x3] : oriented to person, place, and time [Normal Mood] : the mood was normal [Stool Occult Blood] : stool negative for occult blood [de-identified] : Elderly female, [de-identified] : Well  healed surgical scar on L breast, [de-identified] : ambulates with a cane,

## 2020-10-12 NOTE — REVIEW OF SYSTEMS
[Fatigue] : fatigue [Incontinence] : incontinence [Joint Pain] : joint pain [Unsteady Walking] : ataxia [Insomnia] : insomnia [Anxiety] : anxiety [Negative] : Heme/Lymph [Dyspnea on Exertion] : dyspnea on exertion [Fever] : no fever [Chills] : no chills [Recent Change In Weight] : ~T no recent weight change [Dysuria] : no dysuria [Hematuria] : no hematuria [Joint Stiffness] : no joint stiffness [Joint Swelling] : no joint swelling [Muscle Pain] : no muscle pain [Back Pain] : no back pain [Itching] : no itching [Mole Changes] : no mole changes [Skin Rash] : no skin rash [Headache] : no headache [Dizziness] : no dizziness [Fainting] : no fainting [Depression] : no depression [Easy Bleeding] : no easy bleeding [Easy Bruising] : no easy bruising [Swollen Glands] : no swollen glands [FreeTextEntry3] : wears reading glasses, [FreeTextEntry6] : MATHUR hasn't changed, [FreeTextEntry8] : Occ urgency, [FreeTextEntry9] : as in HPI, [de-identified] : ambulates with a cane,  [de-identified] : Takes the Xanax,

## 2020-10-12 NOTE — HISTORY OF PRESENT ILLNESS
[Other: _____] : [unfilled] [de-identified] : Pt presented for PE.  Last PE was 4/2019.  PE was delayed this year due to the COVID-19 pandemic.\par \par Pt continues to have occ ankle swelling.  \par \par She c/o some pain on L ankle pain and knee pain in the morning, but doesn't have it the rest of the day when she moves around.  She uses Biofreeze on it with good response. \par \par Pt was on HCTZ, 1/2 tab of HCTZ.   For a short time, her ankle swelling was worse, and she took 1 tab daily for a short time.\par \par She is constipated, no BRBPR.\par \par Pt got Flu vaccine at the assisted living where she is living now.

## 2020-10-13 LAB
25(OH)D3 SERPL-MCNC: 48.8 NG/ML
ALBUMIN SERPL ELPH-MCNC: 4.5 G/DL
ALP BLD-CCNC: 79 U/L
ALT SERPL-CCNC: 9 U/L
ANION GAP SERPL CALC-SCNC: 11 MMOL/L
APPEARANCE: CLEAR
AST SERPL-CCNC: 12 U/L
BASOPHILS # BLD AUTO: 0.04 K/UL
BASOPHILS NFR BLD AUTO: 0.6 %
BILIRUB SERPL-MCNC: 0.3 MG/DL
BILIRUBIN URINE: NEGATIVE
BLOOD URINE: NEGATIVE
BUN SERPL-MCNC: 22 MG/DL
CALCIUM SERPL-MCNC: 10.5 MG/DL
CHLORIDE SERPL-SCNC: 106 MMOL/L
CHOLEST SERPL-MCNC: 204 MG/DL
CHOLEST/HDLC SERPL: 2.3 RATIO
CK SERPL-CCNC: 84 U/L
CO2 SERPL-SCNC: 26 MMOL/L
COLOR: NORMAL
CREAT SERPL-MCNC: 0.77 MG/DL
EOSINOPHIL # BLD AUTO: 0.04 K/UL
EOSINOPHIL NFR BLD AUTO: 0.6 %
ESTIMATED AVERAGE GLUCOSE: 134 MG/DL
GLUCOSE QUALITATIVE U: NEGATIVE
GLUCOSE SERPL-MCNC: 111 MG/DL
HBA1C MFR BLD HPLC: 6.3 %
HCT VFR BLD CALC: 46.3 %
HDLC SERPL-MCNC: 88 MG/DL
HGB BLD-MCNC: 14.9 G/DL
IMM GRANULOCYTES NFR BLD AUTO: 0.1 %
KETONES URINE: NEGATIVE
LDLC SERPL CALC-MCNC: 95 MG/DL
LEUKOCYTE ESTERASE URINE: ABNORMAL
LYMPHOCYTES # BLD AUTO: 1.74 K/UL
LYMPHOCYTES NFR BLD AUTO: 24.8 %
MAN DIFF?: NORMAL
MCHC RBC-ENTMCNC: 32.2 GM/DL
MCHC RBC-ENTMCNC: 33.3 PG
MCV RBC AUTO: 103.3 FL
MONOCYTES # BLD AUTO: 0.64 K/UL
MONOCYTES NFR BLD AUTO: 9.1 %
NEUTROPHILS # BLD AUTO: 4.56 K/UL
NEUTROPHILS NFR BLD AUTO: 64.8 %
NITRITE URINE: NEGATIVE
PH URINE: 5.5
PLATELET # BLD AUTO: 249 K/UL
POTASSIUM SERPL-SCNC: 4.4 MMOL/L
PROT SERPL-MCNC: 7 G/DL
PROTEIN URINE: NORMAL
RBC # BLD: 4.48 M/UL
RBC # FLD: 12.5 %
SODIUM SERPL-SCNC: 143 MMOL/L
SPECIFIC GRAVITY URINE: 1.03
T4 FREE SERPL-MCNC: 1.1 NG/DL
TRIGL SERPL-MCNC: 107 MG/DL
TSH SERPL-ACNC: 1.19 UIU/ML
UROBILINOGEN URINE: NORMAL
WBC # FLD AUTO: 7.03 K/UL

## 2020-11-02 ENCOUNTER — RX RENEWAL (OUTPATIENT)
Age: 85
End: 2020-11-02

## 2020-12-21 PROBLEM — N39.0 ACUTE UTI: Status: RESOLVED | Noted: 2019-05-21 | Resolved: 2020-12-21

## 2020-12-23 ENCOUNTER — APPOINTMENT (OUTPATIENT)
Dept: INTERNAL MEDICINE | Facility: CLINIC | Age: 85
End: 2020-12-23
Payer: MEDICARE

## 2020-12-23 VITALS
HEIGHT: 65 IN | BODY MASS INDEX: 25.99 KG/M2 | TEMPERATURE: 98.2 F | OXYGEN SATURATION: 96 % | DIASTOLIC BLOOD PRESSURE: 71 MMHG | WEIGHT: 156 LBS | HEART RATE: 66 BPM | SYSTOLIC BLOOD PRESSURE: 151 MMHG

## 2020-12-23 VITALS — SYSTOLIC BLOOD PRESSURE: 138 MMHG | DIASTOLIC BLOOD PRESSURE: 72 MMHG

## 2020-12-23 DIAGNOSIS — M25.472 EFFUSION, LEFT ANKLE: ICD-10-CM

## 2020-12-23 DIAGNOSIS — K59.09 OTHER CONSTIPATION: ICD-10-CM

## 2020-12-23 PROCEDURE — 99214 OFFICE O/P EST MOD 30 MIN: CPT

## 2020-12-23 NOTE — PHYSICAL EXAM
[No Acute Distress] : no acute distress [Well Nourished] : well nourished [Well Developed] : well developed [No JVD] : no jugular venous distention [Supple] : supple [No Respiratory Distress] : no respiratory distress  [Clear to Auscultation] : lungs were clear to auscultation bilaterally [Normal Rate] : normal rate  [Regular Rhythm] : with a regular rhythm [Normal S1, S2] : normal S1 and S2 [Soft] : abdomen soft [Non Tender] : non-tender [Normal Bowel Sounds] : normal bowel sounds [No CVA Tenderness] : no CVA  tenderness [No Spinal Tenderness] : no spinal tenderness [Grossly Normal Strength/Tone] : grossly normal strength/tone [Speech Grossly Normal] : speech grossly normal [Normal Affect] : the affect was normal [Alert and Oriented x3] : oriented to person, place, and time [Normal Mood] : the mood was normal [de-identified] : Elderly female, [de-identified] : Trace L ankle edema, no calf tenderness, some OA changes and ankle was stiff, [de-identified] : Pt was a little withdrawn at the beginning of visit, and became more animated by the end of visit,

## 2020-12-23 NOTE — HISTORY OF PRESENT ILLNESS
[de-identified] : Pt returned today for 3 month f/u.  She has been doing well and did not get sick.  The assisted living facility where she lives has a few residents with positive COVID test.  Pt was fine and did not get the infection.\par \par Pt noted by family that she seems more depressed.  She does some exercise in the morning. She spends time in her room writing cards, reading newspaper and doing some puzzles.  She doesn't watch TV, since she does not hear well.  She denied feeling depressed, but since she has not much to do, she naps a lot.\par \par Pt also c/o constipation recently, and she has to strain, and still does not have good BM.  She eats prune and a fiber cookies at night.  In the morning, she drinks OJ with breakfast.  She has cut out coffee or tea, since the caffeine was affecting her mental state, and she was more clearheaded off caffeine.\par \par Pt also noted some swelling on L ankle and that it sometimes hurt, especially after she stands for a while.  Pain is kind of sharp, but only lasting seconds to minutes.

## 2021-01-11 ENCOUNTER — RX RENEWAL (OUTPATIENT)
Age: 86
End: 2021-01-11

## 2021-01-19 ENCOUNTER — APPOINTMENT (OUTPATIENT)
Dept: INTERNAL MEDICINE | Facility: CLINIC | Age: 86
End: 2021-01-19
Payer: MEDICARE

## 2021-01-19 VITALS — DIASTOLIC BLOOD PRESSURE: 62 MMHG | SYSTOLIC BLOOD PRESSURE: 152 MMHG

## 2021-01-19 VITALS
DIASTOLIC BLOOD PRESSURE: 76 MMHG | OXYGEN SATURATION: 96 % | HEART RATE: 58 BPM | SYSTOLIC BLOOD PRESSURE: 171 MMHG | WEIGHT: 156 LBS | HEIGHT: 65 IN | BODY MASS INDEX: 25.99 KG/M2 | TEMPERATURE: 98.5 F

## 2021-01-19 PROCEDURE — 99214 OFFICE O/P EST MOD 30 MIN: CPT | Mod: 25

## 2021-01-19 PROCEDURE — 69210 REMOVE IMPACTED EAR WAX UNI: CPT

## 2021-01-19 NOTE — HISTORY OF PRESENT ILLNESS
[FreeTextEntry8] : Pt presented acute hearing loss for a couple of weeks.  Head feels clogged and ear feels clogged.   The cotton with peroxide helped her slightly for a short time.  Pt got the coronavirus vaccine, and lost hearing completely the next day and was very concerned.  The nose is also stuffy and runny.  No HA, fever or chills.  There was no recent change of meds.

## 2021-01-19 NOTE — PHYSICAL EXAM
[No Acute Distress] : no acute distress [Well Nourished] : well nourished [Well Developed] : well developed [Normal Sclera/Conjunctiva] : normal sclera/conjunctiva [PERRL] : pupils equal round and reactive to light [EOMI] : extraocular movements intact [Normal Outer Ear/Nose] : the outer ears and nose were normal in appearance [Normal Oropharynx] : the oropharynx was normal [No JVD] : no jugular venous distention [Supple] : supple [No Respiratory Distress] : no respiratory distress  [Clear to Auscultation] : lungs were clear to auscultation bilaterally [Normal Rate] : normal rate  [Regular Rhythm] : with a regular rhythm [Normal S1, S2] : normal S1 and S2 [Soft] : abdomen soft [Non Tender] : non-tender [Normal Bowel Sounds] : normal bowel sounds [Normal Supraclavicular Nodes] : no supraclavicular lymphadenopathy [Normal Posterior Cervical Nodes] : no posterior cervical lymphadenopathy [Normal Anterior Cervical Nodes] : no anterior cervical lymphadenopathy [No Joint Swelling] : no joint swelling [Grossly Normal Strength/Tone] : grossly normal strength/tone [Speech Grossly Normal] : speech grossly normal [Normal Affect] : the affect was normal [Alert and Oriented x3] : oriented to person, place, and time [Normal Mood] : the mood was normal [de-identified] : Elderly female, [de-identified] : R ear canal was impacted with cerumen, no sinus tenderness, but mild nasal turbinated congestion, [de-identified] : Trace L ankle edema, unchanged,

## 2021-02-02 ENCOUNTER — RX RENEWAL (OUTPATIENT)
Age: 86
End: 2021-02-02

## 2021-03-11 ENCOUNTER — APPOINTMENT (OUTPATIENT)
Dept: INTERNAL MEDICINE | Facility: CLINIC | Age: 86
End: 2021-03-11
Payer: MEDICARE

## 2021-03-11 VITALS
TEMPERATURE: 99 F | BODY MASS INDEX: 25.99 KG/M2 | HEART RATE: 70 BPM | HEIGHT: 65 IN | WEIGHT: 156 LBS | OXYGEN SATURATION: 95 % | DIASTOLIC BLOOD PRESSURE: 72 MMHG | SYSTOLIC BLOOD PRESSURE: 158 MMHG

## 2021-03-11 VITALS — DIASTOLIC BLOOD PRESSURE: 72 MMHG | SYSTOLIC BLOOD PRESSURE: 120 MMHG

## 2021-03-11 DIAGNOSIS — H61.21 IMPACTED CERUMEN, RIGHT EAR: ICD-10-CM

## 2021-03-11 PROCEDURE — 36415 COLL VENOUS BLD VENIPUNCTURE: CPT

## 2021-03-11 PROCEDURE — 99214 OFFICE O/P EST MOD 30 MIN: CPT | Mod: 25

## 2021-03-11 NOTE — PHYSICAL EXAM
[No Acute Distress] : no acute distress [Well Developed] : well developed [No JVD] : no jugular venous distention [No Respiratory Distress] : no respiratory distress  [Clear to Auscultation] : lungs were clear to auscultation bilaterally [Normal Rate] : normal rate  [Regular Rhythm] : with a regular rhythm [Normal S1, S2] : normal S1 and S2 [Well Nourished] : well nourished [Soft] : abdomen soft [Non Tender] : non-tender [Normal Bowel Sounds] : normal bowel sounds [No CVA Tenderness] : no CVA  tenderness [No Spinal Tenderness] : no spinal tenderness [No Joint Swelling] : no joint swelling [Grossly Normal Strength/Tone] : grossly normal strength/tone [Speech Grossly Normal] : speech grossly normal [Normal Affect] : the affect was normal [Alert and Oriented x3] : oriented to person, place, and time [Normal Mood] : the mood was normal [de-identified] : elderly female, [de-identified] : Trace L ankle edema, no change,

## 2021-03-11 NOTE — HISTORY OF PRESENT ILLNESS
[Other: _____] : [unfilled] [de-identified] : Pt returned today for follow up.\par \par She already had 2 doses of the COVID vaccine, and tolerated them well.\par \par Pt c/o being sleepy all the time.  Even in the morning after a a good night's sleep.  The nursing facility that she lives in now was in shut down, and a lot of activities were canceled.\par \par Pt also requested refill of her Xanax.

## 2021-03-12 LAB
ALBUMIN SERPL ELPH-MCNC: 4.2 G/DL
ALP BLD-CCNC: 78 U/L
ALT SERPL-CCNC: 9 U/L
ANION GAP SERPL CALC-SCNC: 9 MMOL/L
AST SERPL-CCNC: 13 U/L
BASOPHILS # BLD AUTO: 0.04 K/UL
BASOPHILS NFR BLD AUTO: 0.6 %
BILIRUB SERPL-MCNC: 0.2 MG/DL
BUN SERPL-MCNC: 21 MG/DL
CALCIUM SERPL-MCNC: 10.1 MG/DL
CHLORIDE SERPL-SCNC: 104 MMOL/L
CHOLEST SERPL-MCNC: 165 MG/DL
CO2 SERPL-SCNC: 27 MMOL/L
CREAT SERPL-MCNC: 0.76 MG/DL
EOSINOPHIL # BLD AUTO: 0.06 K/UL
EOSINOPHIL NFR BLD AUTO: 0.9 %
FOLATE SERPL-MCNC: 11.9 NG/ML
GLUCOSE SERPL-MCNC: 105 MG/DL
HCT VFR BLD CALC: 44.3 %
HDLC SERPL-MCNC: 73 MG/DL
HGB BLD-MCNC: 13.9 G/DL
IMM GRANULOCYTES NFR BLD AUTO: 0.2 %
LDLC SERPL CALC-MCNC: 67 MG/DL
LYMPHOCYTES # BLD AUTO: 1.75 K/UL
LYMPHOCYTES NFR BLD AUTO: 26.7 %
MAN DIFF?: NORMAL
MCHC RBC-ENTMCNC: 31.4 GM/DL
MCHC RBC-ENTMCNC: 32.5 PG
MCV RBC AUTO: 103.5 FL
MONOCYTES # BLD AUTO: 0.65 K/UL
MONOCYTES NFR BLD AUTO: 9.9 %
NEUTROPHILS # BLD AUTO: 4.04 K/UL
NEUTROPHILS NFR BLD AUTO: 61.7 %
NONHDLC SERPL-MCNC: 92 MG/DL
PLATELET # BLD AUTO: 246 K/UL
POTASSIUM SERPL-SCNC: 4.3 MMOL/L
PROT SERPL-MCNC: 6.7 G/DL
RBC # BLD: 4.28 M/UL
RBC # FLD: 12.8 %
SODIUM SERPL-SCNC: 141 MMOL/L
T4 FREE SERPL-MCNC: 1.2 NG/DL
TRIGL SERPL-MCNC: 124 MG/DL
TSH SERPL-ACNC: 0.9 UIU/ML
VIT B12 SERPL-MCNC: 403 PG/ML
WBC # FLD AUTO: 6.55 K/UL

## 2021-04-15 ENCOUNTER — RX RENEWAL (OUTPATIENT)
Age: 86
End: 2021-04-15

## 2021-06-08 ENCOUNTER — NON-APPOINTMENT (OUTPATIENT)
Age: 86
End: 2021-06-08

## 2021-06-08 RX ORDER — ATORVASTATIN CALCIUM 10 MG/1
10 TABLET, FILM COATED ORAL
Qty: 90 | Refills: 3 | Status: DISCONTINUED | COMMUNITY
Start: 2017-07-27 | End: 2021-06-08

## 2021-06-10 ENCOUNTER — NON-APPOINTMENT (OUTPATIENT)
Age: 86
End: 2021-06-10

## 2021-06-10 ENCOUNTER — APPOINTMENT (OUTPATIENT)
Dept: INTERNAL MEDICINE | Facility: CLINIC | Age: 86
End: 2021-06-10
Payer: MEDICARE

## 2021-06-10 VITALS
DIASTOLIC BLOOD PRESSURE: 90 MMHG | HEART RATE: 65 BPM | OXYGEN SATURATION: 95 % | TEMPERATURE: 98.8 F | SYSTOLIC BLOOD PRESSURE: 150 MMHG

## 2021-06-10 VITALS — DIASTOLIC BLOOD PRESSURE: 76 MMHG | SYSTOLIC BLOOD PRESSURE: 138 MMHG

## 2021-06-10 PROCEDURE — 99214 OFFICE O/P EST MOD 30 MIN: CPT

## 2021-06-10 RX ORDER — FEXOFENADINE HYDROCHLORIDE 180 MG/1
180 TABLET, FILM COATED ORAL
Refills: 0 | Status: DISCONTINUED | COMMUNITY
Start: 2017-07-27 | End: 2021-06-10

## 2021-06-10 RX ORDER — ALBUTEROL SULFATE 90 UG/1
108 (90 BASE) AEROSOL, METERED RESPIRATORY (INHALATION)
Qty: 85 | Refills: 0 | Status: DISCONTINUED | COMMUNITY
Start: 2017-11-06 | End: 2021-06-10

## 2021-06-10 RX ORDER — ASPIRIN 81 MG/1
81 TABLET ORAL
Refills: 0 | Status: ACTIVE | COMMUNITY
Start: 2017-07-27

## 2021-06-10 RX ORDER — PRENATAL 168/IRON/FOLIC/OMEGA3 27-800-235
250-107-500 CAPSULE ORAL DAILY
Refills: 0 | Status: DISCONTINUED | COMMUNITY
Start: 2017-07-27 | End: 2021-06-10

## 2021-06-10 RX ORDER — SERTRALINE HYDROCHLORIDE 50 MG/1
50 TABLET, FILM COATED ORAL
Qty: 45 | Refills: 3 | Status: DISCONTINUED | COMMUNITY
Start: 2017-07-27 | End: 2021-06-10

## 2021-06-10 RX ORDER — BIOTIN 1000 MCG
1000 TABLET,CHEWABLE ORAL DAILY
Refills: 0 | Status: DISCONTINUED | COMMUNITY
Start: 2017-07-27 | End: 2021-06-10

## 2021-06-10 NOTE — PHYSICAL EXAM
[No Acute Distress] : no acute distress [Well Nourished] : well nourished [Well Developed] : well developed [Well-Appearing] : well-appearing [Supple] : supple [No Respiratory Distress] : no respiratory distress  [Clear to Auscultation] : lungs were clear to auscultation bilaterally [Normal Rate] : normal rate  [Regular Rhythm] : with a regular rhythm [Normal S1, S2] : normal S1 and S2 [No Edema] : there was no peripheral edema [Soft] : abdomen soft [Non Tender] : non-tender [Normal Bowel Sounds] : normal bowel sounds [No CVA Tenderness] : no CVA  tenderness [No Spinal Tenderness] : no spinal tenderness [Speech Grossly Normal] : speech grossly normal [Normal Affect] : the affect was normal [Alert and Oriented x3] : oriented to person, place, and time [Normal Mood] : the mood was normal [de-identified] : Elderly female in wheelchair, [de-identified] : RUE in sling, mininmal tenderness in R proximal arm, [de-identified] : Pt came in today in a wheelchair,
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
## 2021-06-16 ENCOUNTER — TRANSCRIPTION ENCOUNTER (OUTPATIENT)
Age: 86
End: 2021-06-16

## 2021-06-22 ENCOUNTER — NON-APPOINTMENT (OUTPATIENT)
Age: 86
End: 2021-06-22

## 2021-07-01 ENCOUNTER — NON-APPOINTMENT (OUTPATIENT)
Age: 86
End: 2021-07-01

## 2021-07-02 ENCOUNTER — TRANSCRIPTION ENCOUNTER (OUTPATIENT)
Age: 86
End: 2021-07-02

## 2021-07-06 RX ORDER — AMOXICILLIN 500 MG/1
500 CAPSULE ORAL
Qty: 20 | Refills: 0 | Status: ACTIVE | COMMUNITY
Start: 2018-11-06 | End: 1900-01-01

## 2021-08-02 ENCOUNTER — TRANSCRIPTION ENCOUNTER (OUTPATIENT)
Age: 86
End: 2021-08-02

## 2021-08-02 ENCOUNTER — NON-APPOINTMENT (OUTPATIENT)
Age: 86
End: 2021-08-02

## 2021-08-05 ENCOUNTER — APPOINTMENT (OUTPATIENT)
Dept: INTERNAL MEDICINE | Facility: CLINIC | Age: 86
End: 2021-08-05
Payer: MEDICARE

## 2021-08-05 VITALS
HEART RATE: 69 BPM | SYSTOLIC BLOOD PRESSURE: 157 MMHG | WEIGHT: 156 LBS | BODY MASS INDEX: 25.99 KG/M2 | OXYGEN SATURATION: 96 % | HEIGHT: 65 IN | DIASTOLIC BLOOD PRESSURE: 77 MMHG | TEMPERATURE: 98 F

## 2021-08-05 VITALS — DIASTOLIC BLOOD PRESSURE: 72 MMHG | SYSTOLIC BLOOD PRESSURE: 136 MMHG

## 2021-08-05 DIAGNOSIS — Z00.00 ENCOUNTER FOR GENERAL ADULT MEDICAL EXAMINATION W/OUT ABNORMAL FINDINGS: ICD-10-CM

## 2021-08-05 PROCEDURE — 99214 OFFICE O/P EST MOD 30 MIN: CPT

## 2021-08-05 RX ORDER — ALPRAZOLAM 0.25 MG/1
0.25 TABLET ORAL AT BEDTIME
Refills: 0 | Status: COMPLETED | COMMUNITY
Start: 2021-07-01 | End: 2021-08-05

## 2021-08-05 NOTE — HISTORY OF PRESENT ILLNESS
[Other: _____] : [unfilled] [FreeTextEntry1] : Pt presented for f/u. [de-identified] : Pt is still c/o fatigue and not sleeping well.  She's in bed 7-8 hour per night, and gets up to urinate 5-7 x per night. She feels like she is tired when she wakes up.  She does not remember that she wakes up that often.  She feels sleepy in the morning, but is tired the rest of the day.  She would nap at least 2-3 x per day.  \par \par She saw urologist, and is on Myrbetriq for about a month, and there's not much of improvement.  \par \par Pt c/o burning at night after she takes meds.  This has happened a few times in the last few weeks.\par \par She takes her PM meds with dinner around 5 pm, she takes Atorvastatin close to bed time.\par \par Pt's dose of Xanax was increased to 1 tab daily, but that was making her very tired the next morning, so it was moved to dinner time, then dose reduced to 1/2 tab again of the 0.25 mg.  She is still not sleeping well, and still feels tired in the morning.  She has cut out all caffeine from her diet.\par \par Pt is on the higher dose of Sertraline and seems to be less anxious or depress, but takes it at bed time, and c/o heart burn with it.\par \par Pt naps at least 2-3 x during the day.

## 2021-08-05 NOTE — PHYSICAL EXAM
[No Acute Distress] : no acute distress [Well Nourished] : well nourished [Well Developed] : well developed [No JVD] : no jugular venous distention [Supple] : supple [No Respiratory Distress] : no respiratory distress  [Clear to Auscultation] : lungs were clear to auscultation bilaterally [Normal Rate] : normal rate  [Regular Rhythm] : with a regular rhythm [Normal S1, S2] : normal S1 and S2 [No Edema] : there was no peripheral edema [Soft] : abdomen soft [Non Tender] : non-tender [Normal Bowel Sounds] : normal bowel sounds [No CVA Tenderness] : no CVA  tenderness [No Spinal Tenderness] : no spinal tenderness [No Joint Swelling] : no joint swelling [Grossly Normal Strength/Tone] : grossly normal strength/tone [Speech Grossly Normal] : speech grossly normal [Normal Affect] : the affect was normal [Alert and Oriented x3] : oriented to person, place, and time [Normal Mood] : the mood was normal [de-identified] : Elderly female, [de-identified] : Short term memory was mildly reduced,

## 2021-08-06 ENCOUNTER — NON-APPOINTMENT (OUTPATIENT)
Age: 86
End: 2021-08-06

## 2021-08-06 ENCOUNTER — RX RENEWAL (OUTPATIENT)
Age: 86
End: 2021-08-06

## 2021-09-02 ENCOUNTER — TRANSCRIPTION ENCOUNTER (OUTPATIENT)
Age: 86
End: 2021-09-02

## 2021-09-15 ENCOUNTER — APPOINTMENT (OUTPATIENT)
Dept: INTERNAL MEDICINE | Facility: CLINIC | Age: 86
End: 2021-09-15
Payer: MEDICARE

## 2021-09-15 ENCOUNTER — APPOINTMENT (OUTPATIENT)
Dept: RADIOLOGY | Facility: CLINIC | Age: 86
End: 2021-09-15
Payer: MEDICARE

## 2021-09-15 ENCOUNTER — OUTPATIENT (OUTPATIENT)
Dept: OUTPATIENT SERVICES | Facility: HOSPITAL | Age: 86
LOS: 1 days | End: 2021-09-15
Payer: MEDICARE

## 2021-09-15 VITALS
DIASTOLIC BLOOD PRESSURE: 69 MMHG | BODY MASS INDEX: 25.99 KG/M2 | HEART RATE: 73 BPM | WEIGHT: 156 LBS | SYSTOLIC BLOOD PRESSURE: 134 MMHG | HEIGHT: 65 IN | TEMPERATURE: 98.4 F | OXYGEN SATURATION: 94 %

## 2021-09-15 VITALS — SYSTOLIC BLOOD PRESSURE: 128 MMHG | DIASTOLIC BLOOD PRESSURE: 76 MMHG

## 2021-09-15 DIAGNOSIS — R07.89 OTHER CHEST PAIN: ICD-10-CM

## 2021-09-15 LAB
BILIRUB UR QL STRIP: NEGATIVE
GLUCOSE UR-MCNC: NEGATIVE
HCG UR QL: 0.2 EU/DL
HGB UR QL STRIP.AUTO: NEGATIVE
KETONES UR-MCNC: 15
LEUKOCYTE ESTERASE UR QL STRIP: NEGATIVE
NITRITE UR QL STRIP: NEGATIVE
PH UR STRIP: 5.5
PROT UR STRIP-MCNC: 30
SP GR UR STRIP: 1.03

## 2021-09-15 PROCEDURE — 71100 X-RAY EXAM RIBS UNI 2 VIEWS: CPT

## 2021-09-15 PROCEDURE — 99214 OFFICE O/P EST MOD 30 MIN: CPT

## 2021-09-15 NOTE — HISTORY OF PRESENT ILLNESS
[Other: _____] : [unfilled] [FreeTextEntry8] : Pt presented for pain under the L breast for about 5 days.  It's intermittent, but there's pain most of the time.  It's noticeable with movement.  It's a soreness.  The pain can wake her from sleep.  The pain is 10/10, and pt took Tylenol XS 1 tab in the afternoon, and 1 tab at bedtime.  The pain is a pinching pain.   She didn't fall, but think that she may have used a grab bar to pull herself up.  She feels that she is a little more SOB than usual.  There is a redness in the area.

## 2021-09-16 PROCEDURE — 71100 X-RAY EXAM RIBS UNI 2 VIEWS: CPT | Mod: 26,LT

## 2021-10-01 ENCOUNTER — NON-APPOINTMENT (OUTPATIENT)
Age: 86
End: 2021-10-01

## 2021-10-05 ENCOUNTER — NON-APPOINTMENT (OUTPATIENT)
Age: 86
End: 2021-10-05

## 2021-10-12 ENCOUNTER — RX RENEWAL (OUTPATIENT)
Age: 86
End: 2021-10-12

## 2021-11-12 ENCOUNTER — NON-APPOINTMENT (OUTPATIENT)
Age: 86
End: 2021-11-12

## 2021-11-15 ENCOUNTER — APPOINTMENT (OUTPATIENT)
Dept: INTERNAL MEDICINE | Facility: CLINIC | Age: 86
End: 2021-11-15
Payer: MEDICARE

## 2021-11-15 VITALS — HEART RATE: 80 BPM | DIASTOLIC BLOOD PRESSURE: 80 MMHG | SYSTOLIC BLOOD PRESSURE: 120 MMHG

## 2021-11-15 DIAGNOSIS — S42.301A UNSPECIFIED FRACTURE OF SHAFT OF HUMERUS, RIGHT ARM, INITIAL ENCOUNTER FOR CLOSED FRACTURE: ICD-10-CM

## 2021-11-15 DIAGNOSIS — Z09 ENCOUNTER FOR FOLLOW-UP EXAMINATION AFTER COMPLETED TREATMENT FOR CONDITIONS OTHER THAN MALIGNANT NEOPLASM: ICD-10-CM

## 2021-11-15 DIAGNOSIS — F05 DELIRIUM DUE TO KNOWN PHYSIOLOGICAL CONDITION: ICD-10-CM

## 2021-11-15 PROCEDURE — 99214 OFFICE O/P EST MOD 30 MIN: CPT | Mod: 25,95

## 2021-11-15 PROCEDURE — 99496 TRANSJ CARE MGMT HIGH F2F 7D: CPT | Mod: 95

## 2021-11-16 PROBLEM — S42.301A RIGHT HUMERAL FRACTURE: Status: RESOLVED | Noted: 2021-06-10 | Resolved: 2021-11-16

## 2021-11-16 PROBLEM — F05 SUNDOWNING: Status: ACTIVE | Noted: 2021-11-16

## 2021-11-16 PROBLEM — Z09 HOSPITAL DISCHARGE FOLLOW-UP: Status: ACTIVE | Noted: 2021-11-16

## 2021-11-16 RX ORDER — POLYETHYLENE GLYCOL 3350 17 G/17G
17 POWDER, FOR SOLUTION ORAL
Qty: 14 | Refills: 0 | Status: COMPLETED | COMMUNITY
Start: 2021-06-06 | End: 2021-11-16

## 2021-11-16 RX ORDER — HYDROCHLOROTHIAZIDE 12.5 MG/1
12.5 TABLET ORAL
Qty: 90 | Refills: 1 | Status: COMPLETED | COMMUNITY
Start: 2017-05-03 | End: 2021-11-16

## 2021-11-16 RX ORDER — PANTOPRAZOLE 40 MG/1
40 TABLET, DELAYED RELEASE ORAL
Qty: 28 | Refills: 0 | Status: COMPLETED | COMMUNITY
Start: 2021-06-06 | End: 2021-11-16

## 2021-11-16 RX ORDER — SENNA 8.6 MG/1
8.6 TABLET, FILM COATED ORAL
Qty: 42 | Refills: 0 | Status: COMPLETED | COMMUNITY
Start: 2021-06-06 | End: 2021-11-16

## 2021-11-16 NOTE — HISTORY OF PRESENT ILLNESS
[Home] : at home, [unfilled] , at the time of the visit. [Medical Office: ([facility])___] : at the medical office located in  [Other:____] : [unfilled] [Verbal consent obtained from patient] : the patient, [unfilled] [Discharged on ___] : discharged on [unfilled] [Post-hospitalization from ___ Hospital] : Post-hospitalization from [unfilled] Hospital [Admitted on: ___] : The patient was admitted on [unfilled] [Discharge Summary] : discharge summary [Discharge Med List] : discharge medication list [Med Reconciliation] : medication reconciliation has been completed [Patient Contacted By: ____] : and contacted by [unfilled] [FreeTextEntry3] : daughter & son in law, Daniel Perea [FreeTextEntry2] : Pt presented for the Telehealth encounter with her daughter and son in law, as she was discharged a few days ago, and is still too weak to travel to the office.\par \par Patient was initially admitted to [facility] on 10/1/2021, after a fall.  She sustained 4 broken ribs.  She was hospitalized for observation and pain control.  Patient was then discharged to [facility] for inpatient rehab on 10/4/2021, she did well at rehab without any complications.  Patient was finally discharged back to the assisted living facility at the [facility] on 11/11/2021.\par \par The patient stated that she has minimal pain at this point.  She is using the walker, but feels that the walker was related to heavy.  She is going downstairs for her meals, but tries to have dinner in her room.  She is sleeping better at night.  She saw the psychiatrist and the rehab facility, and is now taking Xanax twice daily.  Patient also was started on Myrbetriq for OAB, and does not have to wake up as often anymore to use the bathroom.  She is healing well and bowel movements regular.  She is still weak and spends most of the days in her room.  The visiting nurse has opened the case, and the patient will be starting PT/OT.  She does have a 24/7 home attendant.  The family feels that her memory was not the same, she seems a bit disoriented.  She forgot that she has been living at the assisted living facility for couple of years, but did recognize some of the staff's and other people when she returned to the facility.  Her daughter stated there was some sundowning, but they were able to reorient her every day.\par \par Pt had labs done with normal CBC and BMP except glucose of 104, labs were done 1 day prior to discharge.

## 2021-11-16 NOTE — PHYSICAL EXAM
[No Acute Distress] : no acute distress [No Respiratory Distress] : no respiratory distress  [Speech Grossly Normal] : speech grossly normal [Normal Affect] : the affect was normal [Normal Mood] : the mood was normal [92988 - High Complexity requires an extensive number of possible diagnoses and/or the management options, extensive complexity of the medical data (tests, etc.) to be reviewed, and a high risk of significant complications, morbidity, and/or mortality as w] : High Complexity  [de-identified] : Elderly female, exam limited due to telehealth encounter, VS provide by pt's family,
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
## 2021-11-29 ENCOUNTER — NON-APPOINTMENT (OUTPATIENT)
Age: 86
End: 2021-11-29

## 2021-11-29 ENCOUNTER — TRANSCRIPTION ENCOUNTER (OUTPATIENT)
Age: 86
End: 2021-11-29

## 2021-12-13 ENCOUNTER — NON-APPOINTMENT (OUTPATIENT)
Age: 86
End: 2021-12-13

## 2021-12-16 ENCOUNTER — TRANSCRIPTION ENCOUNTER (OUTPATIENT)
Age: 86
End: 2021-12-16

## 2021-12-17 ENCOUNTER — TRANSCRIPTION ENCOUNTER (OUTPATIENT)
Age: 86
End: 2021-12-17

## 2022-01-11 ENCOUNTER — APPOINTMENT (OUTPATIENT)
Dept: INTERNAL MEDICINE | Facility: CLINIC | Age: 87
End: 2022-01-11
Payer: MEDICARE

## 2022-01-11 VITALS — HEART RATE: 76 BPM | DIASTOLIC BLOOD PRESSURE: 70 MMHG | SYSTOLIC BLOOD PRESSURE: 140 MMHG

## 2022-01-11 DIAGNOSIS — Z00.00 ENCOUNTER FOR GENERAL ADULT MEDICAL EXAMINATION W/OUT ABNORMAL FINDINGS: ICD-10-CM

## 2022-01-11 DIAGNOSIS — Z92.29 PERSONAL HISTORY OF OTHER DRUG THERAPY: ICD-10-CM

## 2022-01-11 DIAGNOSIS — G47.00 INSOMNIA, UNSPECIFIED: ICD-10-CM

## 2022-01-11 PROCEDURE — 99214 OFFICE O/P EST MOD 30 MIN: CPT | Mod: 95

## 2022-01-11 NOTE — PHYSICAL EXAM
[No Acute Distress] : no acute distress [No Respiratory Distress] : no respiratory distress  [Clear to Auscultation] : lungs were clear to auscultation bilaterally [Normal Rate] : normal rate  [Regular Rhythm] : with a regular rhythm [No Edema] : there was no peripheral edema [Soft] : abdomen soft [Non Tender] : non-tender [Speech Grossly Normal] : speech grossly normal [Normal Affect] : the affect was normal [Normal Mood] : the mood was normal [de-identified] : elderly female, exam limited due to telehealth encounter, exam done by her son in law, Dr. Perea

## 2022-01-11 NOTE — HISTORY OF PRESENT ILLNESS
[Home] : at home, [unfilled] , at the time of the visit. [Medical Office: ([facility])___] : at the medical office located in  [Verbal consent obtained from patient] : the patient, [unfilled] [FreeTextEntry3] : Daughter & son in law, Sophie and Jasbir Perea, [de-identified] : Pt presented for this telehealth to discuss her condition.\par \par Pt is sleeping better.  She is on Melatonin, and also getting PT later in the day, family is there to walk her after dinner.  Pt tired out and then slept better.  She has been falling sleep earlier, and able to sleep through most of the night, and only wakes up once to go to the bathroom.  Because pt sleeps earlier in the evening, she is skipping her meds that's usually given at bedtime.  So, she needs new order for her medication.  She is attending activities at the assisted living facilities where she lives, and usually does participate in those session.\par \par Pt has seen Psychiatrist.  She was started on melatonin 5 mg daily.  She will f/u with psych this week.  Family also like her to have OT.  Pt is now on Sertraline 150 mg daily and Xanax 0.25 mg BID.  They would like her to take Ca/D, rather than Calcium and D separately.

## 2022-01-11 NOTE — ASSESSMENT
[FreeTextEntry1] : Her family is having the assisted living facilities prepared orders to change the time of her PM meds, as well as adjusting her Ca/D supplement, as discussed.  I will sign them after review orders sent to me.

## 2022-01-19 ENCOUNTER — RX RENEWAL (OUTPATIENT)
Age: 87
End: 2022-01-19

## 2022-01-21 ENCOUNTER — TRANSCRIPTION ENCOUNTER (OUTPATIENT)
Age: 87
End: 2022-01-21

## 2022-01-24 ENCOUNTER — TRANSCRIPTION ENCOUNTER (OUTPATIENT)
Age: 87
End: 2022-01-24

## 2022-01-24 DIAGNOSIS — R53.1 WEAKNESS: ICD-10-CM

## 2022-02-07 ENCOUNTER — NON-APPOINTMENT (OUTPATIENT)
Age: 87
End: 2022-02-07

## 2022-02-11 ENCOUNTER — RX RENEWAL (OUTPATIENT)
Age: 87
End: 2022-02-11

## 2022-03-08 ENCOUNTER — APPOINTMENT (OUTPATIENT)
Dept: INTERNAL MEDICINE | Facility: CLINIC | Age: 87
End: 2022-03-08
Payer: MEDICARE

## 2022-03-08 VITALS
WEIGHT: 126 LBS | HEART RATE: 72 BPM | DIASTOLIC BLOOD PRESSURE: 65 MMHG | SYSTOLIC BLOOD PRESSURE: 130 MMHG | BODY MASS INDEX: 20.97 KG/M2

## 2022-03-08 PROCEDURE — 99442: CPT | Mod: 95

## 2022-03-08 NOTE — PHYSICAL EXAM
[No Acute Distress] : no acute distress [No Respiratory Distress] : no respiratory distress  [Speech Grossly Normal] : speech grossly normal [de-identified] : Unable to examine pt due to telephonic encounter, VS provided by family,

## 2022-03-08 NOTE — HISTORY OF PRESENT ILLNESS
[Home] : at home, [unfilled] , at the time of the visit. [Medical Office: ([facility])___] : at the medical office located in  [Verbal consent obtained from patient] : the patient, [unfilled] [FreeTextEntry3] : daughter & son in law, Daniel ruiz [de-identified] : Her daughter felt that she has mild swollen toes, the visiting nurse looked at it yesterday.  Her R 2nd, 3rd, 4th & 5th toes are slightly black and blue, and ? some odor from it, there is some skin breakdown.  They have clean it up, and her son in law, who is a retired physician, feels that simple dressing is adequate, and the DP pulse was  present.  It's not tender.  Pt is not c/o pain, and is ambulating and bearing weight on her feet.\par \par Pt is seeing psychiatrist, and started on Remeron 2 weeks ago to take in the evening, and she tolerated it well. [FreeTextEntry1] : Pt presented for this telehealth encounter for follow up, as she had swollen foot.

## 2022-03-31 ENCOUNTER — APPOINTMENT (OUTPATIENT)
Dept: INTERNAL MEDICINE | Facility: CLINIC | Age: 87
End: 2022-03-31
Payer: MEDICARE

## 2022-03-31 VITALS
HEART RATE: 68 BPM | WEIGHT: 128 LBS | SYSTOLIC BLOOD PRESSURE: 125 MMHG | DIASTOLIC BLOOD PRESSURE: 80 MMHG | BODY MASS INDEX: 21.3 KG/M2

## 2022-03-31 PROCEDURE — 99213 OFFICE O/P EST LOW 20 MIN: CPT | Mod: 95

## 2022-03-31 NOTE — HISTORY OF PRESENT ILLNESS
[Home] : at home, [unfilled] , at the time of the visit. [Medical Office: ([facility])___] : at the medical office located in  [FreeTextEntry1] : Pt's daughter arranged for this telehealth encounter because pt is getting PT, and need a face to face encounter for pt to continue with PT. [FreeTextEntry3] : Daughter & son in law - Sophie & Jasbir Perea [de-identified] : Pt is doing well w/o any new complaint.\par \par She has been very weak since her recent hospitalization and hospital stay.  She is practically wheelchair bound at this point, she's not able to transfer or ambulate with a walker yet.  Pt was previously ambulating with a walker prior to her hospitalization a few months ago.\par \par She is sleeping better than before, but still wakes up a couple of times through out the night.  She still has anxiety during the day, and saw psychiatrist recently, the dose of Remeron was increased from 7.5 mg QHS to 7.5 mg BID.\par \par The rest of meds were unchanged

## 2022-03-31 NOTE — PHYSICAL EXAM
[No Acute Distress] : no acute distress [Well Nourished] : well nourished [No Respiratory Distress] : no respiratory distress  [Clear to Auscultation] : lungs were clear to auscultation bilaterally [Normal S1, S2] : normal S1 and S2 [No Edema] : there was no peripheral edema [Soft] : abdomen soft [Non Tender] : non-tender [Normal Bowel Sounds] : normal bowel sounds [Speech Grossly Normal] : speech grossly normal [Normal Mood] : the mood was normal [de-identified] : elderly female sitting in a wheelchair, exam limited due to telehealth encounter, VS and exam details provided by son in law who is a physician, [de-identified] : Alert, verbal and answers appropriately, but let family talk during the interview,

## 2022-04-04 ENCOUNTER — TRANSCRIPTION ENCOUNTER (OUTPATIENT)
Age: 87
End: 2022-04-04

## 2022-04-05 ENCOUNTER — TRANSCRIPTION ENCOUNTER (OUTPATIENT)
Age: 87
End: 2022-04-05

## 2022-06-01 ENCOUNTER — TRANSCRIPTION ENCOUNTER (OUTPATIENT)
Age: 87
End: 2022-06-01

## 2022-07-14 ENCOUNTER — RX RENEWAL (OUTPATIENT)
Age: 87
End: 2022-07-14

## 2022-08-29 ENCOUNTER — RX RENEWAL (OUTPATIENT)
Age: 87
End: 2022-08-29

## 2022-10-07 ENCOUNTER — RX RENEWAL (OUTPATIENT)
Age: 87
End: 2022-10-07

## 2022-10-17 ENCOUNTER — RX RENEWAL (OUTPATIENT)
Age: 87
End: 2022-10-17

## 2022-12-09 ENCOUNTER — TRANSCRIPTION ENCOUNTER (OUTPATIENT)
Age: 87
End: 2022-12-09

## 2022-12-16 ENCOUNTER — TRANSCRIPTION ENCOUNTER (OUTPATIENT)
Age: 87
End: 2022-12-16

## 2022-12-20 ENCOUNTER — APPOINTMENT (OUTPATIENT)
Dept: INTERNAL MEDICINE | Facility: CLINIC | Age: 87
End: 2022-12-20

## 2022-12-20 VITALS — DIASTOLIC BLOOD PRESSURE: 76 MMHG | HEART RATE: 76 BPM | SYSTOLIC BLOOD PRESSURE: 140 MMHG

## 2022-12-20 PROCEDURE — 99213 OFFICE O/P EST LOW 20 MIN: CPT | Mod: 95

## 2022-12-20 RX ORDER — GLUCOSAMINE/MSM/CHONDROIT SULF 500-166.6
10 TABLET ORAL DAILY
Refills: 0 | Status: ACTIVE | COMMUNITY
Start: 2022-01-11

## 2022-12-20 NOTE — HISTORY OF PRESENT ILLNESS
[Home] : at home, [unfilled] , at the time of the visit. [Medical Office: ([facility])___] : at the medical office located in  [Other:____] : [unfilled] [Verbal consent obtained from patient] : the patient, [unfilled] [FreeTextEntry1] : Pt presented for this follow up visit via telehealth. [de-identified] : Pt stated she feels well, but  she gets scared easily, still has a lot of anxiety.  She is on Xanax TID and she takes Melatonin and Mirtazapine at night, so sleeps well.  She is still taking Sertraline 100 mg daily.  She is awake most of the days, and does not take too many naps during the day.\par \par Nose bleed has stopped, she has stopped ASA for 5 days and will restart low dose ASA soon in 1-2 days.\par \par Pt had 5 doses of COVID vaccine and had Flu vaccine already.\par \par His son in law a retired MD listen to her heart and lung, lung was clear, but heart beat has perhaps ectopy.  Pt denied any CP, SOB or dizziness.\par \par Pt is very sedentary, and ran out of coverage for PT 1-2 months ago and would like to restart that in 1/2023.\par \par Her family also would like to get a handicapped parking sticker in case they need to take her out to doctor's office, etc, and so has sent over an application in the mail

## 2022-12-20 NOTE — PHYSICAL EXAM
[No Acute Distress] : no acute distress [Well Nourished] : well nourished [No Respiratory Distress] : no respiratory distress  [de-identified] : Elderly female, VS provided by family, exam limited due to telehealth encounter, [de-identified] : Clear as per her son in law, Dr. Perea, [de-identified] : regular, but has ectopy as per her son in law, Dr. Perea, [de-identified] : Awake and alert, verbal, able to answer questions, speaks in sentence but slowly,

## 2022-12-22 ENCOUNTER — NON-APPOINTMENT (OUTPATIENT)
Age: 87
End: 2022-12-22

## 2023-01-16 ENCOUNTER — RX RENEWAL (OUTPATIENT)
Age: 88
End: 2023-01-16

## 2023-02-06 ENCOUNTER — RX RENEWAL (OUTPATIENT)
Age: 88
End: 2023-02-06

## 2023-02-28 ENCOUNTER — RX RENEWAL (OUTPATIENT)
Age: 88
End: 2023-02-28

## 2023-03-15 ENCOUNTER — TRANSCRIPTION ENCOUNTER (OUTPATIENT)
Age: 88
End: 2023-03-15

## 2023-07-10 ENCOUNTER — RX RENEWAL (OUTPATIENT)
Age: 88
End: 2023-07-10

## 2023-07-11 ENCOUNTER — APPOINTMENT (OUTPATIENT)
Dept: INTERNAL MEDICINE | Facility: CLINIC | Age: 88
End: 2023-07-11
Payer: MEDICARE

## 2023-07-11 VITALS — SYSTOLIC BLOOD PRESSURE: 110 MMHG | HEART RATE: 56 BPM | DIASTOLIC BLOOD PRESSURE: 60 MMHG

## 2023-07-11 VITALS
BODY MASS INDEX: 21.3 KG/M2 | WEIGHT: 128 LBS | HEART RATE: 56 BPM | SYSTOLIC BLOOD PRESSURE: 110 MMHG | DIASTOLIC BLOOD PRESSURE: 60 MMHG

## 2023-07-11 DIAGNOSIS — S22.49XA MULTIPLE FRACTURES OF RIBS, UNSPECIFIED SIDE, INITIAL ENCOUNTER FOR CLOSED FRACTURE: ICD-10-CM

## 2023-07-11 DIAGNOSIS — S90.31XA CONTUSION OF RIGHT FOOT, INITIAL ENCOUNTER: ICD-10-CM

## 2023-07-11 DIAGNOSIS — R07.89 OTHER CHEST PAIN: ICD-10-CM

## 2023-07-11 DIAGNOSIS — R04.0 EPISTAXIS: ICD-10-CM

## 2023-07-11 PROCEDURE — 99214 OFFICE O/P EST MOD 30 MIN: CPT | Mod: 95

## 2023-07-11 RX ORDER — MIRTAZAPINE 45 MG/1
45 TABLET, FILM COATED ORAL
Qty: 45 | Refills: 1 | Status: ACTIVE | COMMUNITY
Start: 2022-03-08

## 2023-07-11 RX ORDER — CHLORHEXIDINE GLUCONATE 4 %
1000 LIQUID (ML) TOPICAL
Qty: 90 | Refills: 3 | Status: ACTIVE | COMMUNITY
Start: 2023-07-11

## 2023-07-11 RX ORDER — CHROMIUM 200 MCG
25 MCG TABLET ORAL
Refills: 0 | Status: COMPLETED | COMMUNITY
Start: 2021-06-10 | End: 2023-07-11

## 2023-07-11 NOTE — PHYSICAL EXAM
[No Acute Distress] : no acute distress [No Respiratory Distress] : no respiratory distress  [de-identified] : Verbal, alert, [de-identified] : Elderly female, VS provided by family,

## 2023-07-11 NOTE — HISTORY OF PRESENT ILLNESS
[Home] : at home, [unfilled] , at the time of the visit. [Medical Office: ([facility])___] : at the medical office located in  [Other:____] : [unfilled] [FreeTextEntry3] : Pt's daughter and son in law [de-identified] : There is a slight skin breakdown on L hip, probably a stage 1- 2.  it is probably from the friction of sitting in a wheelchair.  The area is not red or painful, but noted by the home attendant.\par \par She does very little walking now, getting some PT 2x per week.  She is wheeled to the bathroom and assisted to the toilet, and wheeled to the shower and stand for the shower, there is a bench.\par \par Appetite is decent.  Sleeps is ok, a lot of anxiety in the afternoon and early evening, so takes Xanax earlier by an hour.  Sleeps better, but wakes up at 4-5 am, but goes to bed at about 8 pm.  No recent falls.  Still has anxiety, sometimes look for daughter, despite frequent visit from family and daily phone calls.

## 2023-08-09 ENCOUNTER — TRANSCRIPTION ENCOUNTER (OUTPATIENT)
Age: 88
End: 2023-08-09

## 2023-09-04 ENCOUNTER — RX RENEWAL (OUTPATIENT)
Age: 88
End: 2023-09-04

## 2023-10-31 ENCOUNTER — RX RENEWAL (OUTPATIENT)
Age: 88
End: 2023-10-31

## 2023-10-31 ENCOUNTER — TRANSCRIPTION ENCOUNTER (OUTPATIENT)
Age: 88
End: 2023-10-31

## 2023-10-31 RX ORDER — MIRABEGRON 50 MG/1
50 TABLET, FILM COATED, EXTENDED RELEASE ORAL
Qty: 90 | Refills: 3 | Status: ACTIVE | COMMUNITY
Start: 2023-10-31 | End: 1900-01-01

## 2024-01-02 ENCOUNTER — APPOINTMENT (OUTPATIENT)
Dept: INTERNAL MEDICINE | Facility: CLINIC | Age: 89
End: 2024-01-02
Payer: MEDICARE

## 2024-01-02 VITALS — HEART RATE: 72 BPM | DIASTOLIC BLOOD PRESSURE: 70 MMHG | SYSTOLIC BLOOD PRESSURE: 115 MMHG

## 2024-01-02 DIAGNOSIS — L89.222 PRESSURE ULCER OF LEFT HIP, STAGE 2: ICD-10-CM

## 2024-01-02 DIAGNOSIS — I27.20 PULMONARY HYPERTENSION, UNSPECIFIED: ICD-10-CM

## 2024-01-02 DIAGNOSIS — I48.0 PAROXYSMAL ATRIAL FIBRILLATION: ICD-10-CM

## 2024-01-02 DIAGNOSIS — R53.81 OTHER MALAISE: ICD-10-CM

## 2024-01-02 DIAGNOSIS — K21.9 GASTRO-ESOPHAGEAL REFLUX DISEASE W/OUT ESOPHAGITIS: ICD-10-CM

## 2024-01-02 PROCEDURE — 99214 OFFICE O/P EST MOD 30 MIN: CPT

## 2024-01-02 RX ORDER — LANSOPRAZOLE 15 MG/1
15 CAPSULE, DELAYED RELEASE ORAL
Qty: 40 | Refills: 3 | Status: DISCONTINUED | COMMUNITY
Start: 2018-10-09 | End: 2024-01-02

## 2024-01-02 RX ORDER — CALCIUM CITRATE/VITAMIN D3 200MG-6.25
TABLET ORAL
Refills: 0 | Status: DISCONTINUED | COMMUNITY
Start: 2021-06-10 | End: 2024-01-02

## 2024-01-02 RX ORDER — ALPRAZOLAM 0.25 MG/1
0.25 TABLET ORAL 4 TIMES DAILY
Qty: 90 | Refills: 0 | Status: ACTIVE | COMMUNITY
Start: 2019-05-21

## 2024-01-02 NOTE — PHYSICAL EXAM
[No Respiratory Distress] : no respiratory distress  [Clear to Auscultation] : lungs were clear to auscultation bilaterally [No Edema] : there was no peripheral edema [Soft] : abdomen soft [Non Tender] : non-tender [No Acute Distress] : no acute distress [de-identified] : VS provided by pt's son in law, Dr. Jasbir Kinney, he also provided info on exam below, Elderly female sitting in wheelchair, exam limited due to telehealth encounter,  [de-identified] : Contracture of L leg, a little more progressive, [de-identified] : Answers simple question appropriately, awake & verbal, but limited conversation,

## 2024-01-02 NOTE — HISTORY OF PRESENT ILLNESS
[Home] : at home, [unfilled] , at the time of the visit. [Medical Office: ([facility])___] : at the medical office located in  [Verbal consent obtained from patient] : the patient, [unfilled] [Other:____] : [unfilled] [FreeTextEntry3] : Pt's daughter, Sophie Perea, [FreeTextEntry1] : Pt presented for this telehealth encounter for follow up of  [de-identified] : Pt lives in assisted living.  Has a contracture of L leg and is progressing, harder with transfer.  Communications skills is less than before.  No skin breakdown that we know of.  No ambulation, she is bed or wheelchair bound.  More difficulty to follow commands.  Eating ok, a little harder with swallowing meds.    She has a new psychiatrist.  She's able to sleeps through the night most of the time on the current regimen of meds.  She wakes up occ at 4 am.   She has a private aid at night.   Family would like her to restart PT.  She had COVID infection in 9/2023, she was asymptomatic.  She was not treated.  She had Flu vaccine, but missed the COVID vaccine, she also had RSV vaccine.

## 2024-01-08 ENCOUNTER — RX RENEWAL (OUTPATIENT)
Age: 89
End: 2024-01-08

## 2024-01-08 RX ORDER — LOSARTAN POTASSIUM 50 MG/1
50 TABLET, FILM COATED ORAL
Qty: 90 | Refills: 3 | Status: ACTIVE | COMMUNITY
Start: 2017-07-27 | End: 1900-01-01

## 2024-01-09 NOTE — PHYSICAL EXAM
Home Care Instructions    LAPAROSCOPIC PD Catheter placement    1. You have incisions with absorbable sutures underneath the skin so no suture removal are needed.  The PD catheter site might drain clear fluid.  Change the dressing as needed.     2. You can shower the day after surgery.  The incisions can get wet with water and soap.  Just pat your incisions dry after showering.  Avoid soaking in a bath tub for one week.  Avoid heavy lifting (greater than 10 lbs or anything heavier than a gallon of milk) for six weeks after surgery.    3. Be up and around when you are home.  The more you walk, the faster your recovery will be.    4. You may have an abdominal binder (medical girdle).  Wear it when you are up and moving around.  The bottom of the binder should cover a little of your hip bones to provide support to your lower abdomen.  Avoid wearing the binder too high as it may make your discomfort worse.    5. Take pain medications around the clock for the first few days after surgery regardless if you have pain or not.  The pain medications take about 30 minutes to work so if you wait until you experience pain, then you might be uncomfortable during those 30 minutes.    6.  A well known side effect of pain medication is constipation.  It is the number 1, 2, and 3 reason why patients call me after surgery.  Adequate hydration and stool softeners are ways to minimize the risk of constipation after surgery.  Drink a lot of fluid when you are at home.  Check your urine color.  If it's dark, then you are dehydrated and need to drink more water.  Take as many stool softeners (morning, noon, evening) as you need to have about one bowel movement a day.  Don't let four or five days go by without a bowel movement.  If that occurs, then you might need a rectal suppository or an enema to treat the constipation.    7.  You may drive when you are no longer taking prescription pain medications with narcotics.  If your degree of  discomfort is minimal, extra strength tylenol alternating with ibuprofen could be used as necessary.    8. Please contact the office ([phone]) to schedule a telephone visit approximately two weeks after surgery.  At that time, if there are any issues, then we will schedule an in person clinic visit.    9. Signs and symptoms of post-operative problems include abdominal pain associated with nausea and/or vomiting, fever, chills, excessive drainage or pain at the incision sites, leg swelling/pain, or chest pain. Contact our office immediately if these signs or symptoms occur.    10. If you have any problems or questions please contact me at any time day or night.  My cell phone number is [phone].       HOME INSTRUCTIONS  AMBSURG HOME CARE INSTRUCTIONS: POST-OP ANESTHESIA  The medication that you received for sedation or general anesthesia can last up to 24 hours. Your judgment and reflexes may be altered, even if you feel like your normal self.      We Recommend:   Do not drive any motor vehicle or bicycle   Avoid mowing the lawn, playing sports, or working with power tools/applicances (power saws, electric knives or mixers)   That you have someone stay with you on your first night home   Do not drink alcohol or take sleeping pills or tranquilizers   Do not sign legal documents within 24 hours of your procedure   If you had a nerve block for your surgery, take extra care not to put any pressure on your arm or hand for 24 hours    It is normal:  For you to have a sore throat if you had a breathing tube during surgery (while you were asleep!). The sore throat should get better within 48 hours. You can gargle with warm salt water (1/2 tsp in 4 oz warm water) or use a throat lozenge for comfort  To feel muscle aches or soreness especially in the abdomen, chest or neck. The achy feeling should go away in the next 24 hours  To feel weak, sleepy or \"wiped out\". Your should start feeling better in the next 24 hours.    To experience mild discomforts such as sore lip or tongue, headache, cramps, gas pains or a bloated feeling in your abdomen.   To experience mild back pain or soreness for a day or two if you had spinal or epidural anesthesia.   If you had laparoscopic surgery, to feel shoulder pain or discomfort on the day of surgery.   For some patients to have nausea after surgery/anesthesia    If you feel nausea or experience vomiting:   Try to move around less.   Eat less than usual or drink only liquids until the next morning   Nausea should resolve in about 24 hours    If you have a problem when you are at home:    Call your surgeons office   Discharge Instructions: After Your Surgery  You’ve just had surgery. During surgery, you were given medicine called anesthesia to keep you relaxed and free of pain. After surgery, you may have some pain or nausea. This is common. Here are some tips for feeling better and getting well after surgery.   Going home  Your healthcare provider will show you how to take care of yourself when you go home. They'll also answer your questions. Have an adult family member or friend drive you home. For the first 24 hours after your surgery:   Don't drive or use heavy equipment.  Don't make important decisions or sign legal papers.  Take medicines as directed.  Don't drink alcohol.  Have someone stay with you, if needed. They can watch for problems and help keep you safe.  Be sure to go to all follow-up visits with your healthcare provider. And rest after your surgery for as long as your provider tells you to.   Coping with pain  If you have pain after surgery, pain medicine will help you feel better. Take it as directed, before pain becomes severe. Also, ask your healthcare provider or pharmacist about other ways to control pain. This might be with heat, ice, or relaxation. And follow any other instructions your surgeon or nurse gives you.      Stay on schedule with your medicine.     Tips for  [No Acute Distress] : no acute distress taking pain medicine  To get the best relief possible, remember these points:   Pain medicines can upset your stomach. Taking them with a little food may help.  Most pain relievers taken by mouth need at least 20 to 30 minutes to start to work.  Don't wait till your pain becomes severe before you take your medicine. Try to time your medicine so that you can take it before starting an activity. This might be before you get dressed, go for a walk, or sit down for dinner.  Constipation is a common side effect of some pain medicines. Call your healthcare provider before taking any medicines such as laxatives or stool softeners to help ease constipation. Also ask if you should skip any foods. Drinking lots of fluids and eating foods such as fruits and vegetables that are high in fiber can also help. Remember, don't take laxatives unless your surgeon has prescribed them.  Drinking alcohol and taking pain medicine can cause dizziness and slow your breathing. It can even be deadly. Don't drink alcohol while taking pain medicine.  Pain medicine can make you react more slowly to things. Don't drive or run machinery while taking pain medicine.  Your healthcare provider may tell you to take acetaminophen to help ease your pain. Ask them how much you're supposed to take each day. Acetaminophen or other pain relievers may interact with your prescription medicines or other over-the-counter (OTC) medicines. Some prescription medicines have acetaminophen and other ingredients in them. Using both prescription and OTC acetaminophen for pain can cause you to accidentally overdose. Read the labels on your OTC medicines with care. This will help you to clearly know the list of ingredients, how much to take, and any warnings. It may also help you not take too much acetaminophen. If you have questions or don't understand the information, ask your pharmacist or healthcare provider to explain it to you before you take the OTC medicine.    Managing nausea  Some people have an upset stomach (nausea) after surgery. This is often because of anesthesia, pain, or pain medicine, less movement of food in the stomach, or the stress of surgery. These tips will help you handle nausea and eat healthy foods as you get better. If you were on a special food plan before surgery, ask your healthcare provider if you should follow it while you get better. Check with your provider on how your eating should progress. It may depend on the surgery you had. These general tips may help:   Don't push yourself to eat. Your body will tell you when to eat and how much.  Start off with clear liquids and soup. They're easier to digest.  Next try semi-solid foods as you feel ready. These include mashed potatoes, applesauce, and gelatin.  Slowly move to solid foods. Don’t eat fatty, rich, or spicy foods at first.  Don't force yourself to have 3 large meals a day. Instead eat smaller amounts more often.  Take pain medicines with a small amount of solid food, such as crackers or toast. This helps prevent nausea.  When to call your healthcare provider  Call your healthcare provider right away if you have any of these:   You still have too much pain, or the pain gets worse, after taking the medicine. The medicine may not be strong enough. Or there may be a complication from the surgery.  You feel too sleepy, dizzy, or groggy. The medicine may be too strong.  Side effects such as nausea or vomiting. Your healthcare provider may advise taking other medicines to .  Skin changes such as rash, itching, or hives. This may mean you have an allergic reaction. Your provider may advise taking other medicines.  The incision looks different (for instance, part of it opens up).  Bleeding or fluid leaking from the incision site, and weren't told to expect that.  Fever of 100.4°F (38°C) or higher, or as directed by your provider.  Call 911  Call 911 right away if you have:   Trouble  [Well Nourished] : well nourished [Well Developed] : well developed breathing  Facial swelling    If you have obstructive sleep apnea   You were given anesthesia medicine during surgery to keep you comfortable and free of pain. After surgery, you may have more apnea spells because of this medicine and other medicines you were given. The spells may last longer than normal.    At home:  Keep using the continuous positive airway pressure (CPAP) device when you sleep. Unless your healthcare provider tells you not to, use it when you sleep, day or night. CPAP is a common device used to treat obstructive sleep apnea.  Talk with your provider before taking any pain medicine, muscle relaxants, or sedatives. Your provider will tell you about the possible dangers of taking these medicines.  Contact your provider if your sleeping changes a lot even when taking medicines as directed.  Elder last reviewed this educational content on 10/1/2021  © 2171-3331 [employer]. All rights reserved. This information is not intended as a substitute for professional medical care. Always follow your healthcare professional's instructions.         [No JVD] : no jugular venous distention [Supple] : supple [No Respiratory Distress] : no respiratory distress  [Clear to Auscultation] : lungs were clear to auscultation bilaterally [Normal Rate] : normal rate  [Regular Rhythm] : with a regular rhythm [Normal S1, S2] : normal S1 and S2 [No Edema] : there was no peripheral edema [Soft] : abdomen soft [Non Tender] : non-tender [Normal Bowel Sounds] : normal bowel sounds [No CVA Tenderness] : no CVA  tenderness [No Spinal Tenderness] : no spinal tenderness [No Rash] : no rash [Speech Grossly Normal] : speech grossly normal [Normal Affect] : the affect was normal [Alert and Oriented x3] : oriented to person, place, and time [Normal Mood] : the mood was normal [de-identified] : Elderly female, [de-identified] : Pt has tenderness on left chest wall under her breast by the axillary line,

## 2024-01-16 ENCOUNTER — NON-APPOINTMENT (OUTPATIENT)
Age: 89
End: 2024-01-16

## 2024-01-16 ENCOUNTER — TRANSCRIPTION ENCOUNTER (OUTPATIENT)
Age: 89
End: 2024-01-16

## 2024-02-01 ENCOUNTER — RX RENEWAL (OUTPATIENT)
Age: 89
End: 2024-02-01

## 2024-02-01 RX ORDER — SERTRALINE HYDROCHLORIDE 100 MG/1
100 TABLET, FILM COATED ORAL
Qty: 90 | Refills: 3 | Status: ACTIVE | COMMUNITY
Start: 2019-04-04 | End: 1900-01-01

## 2024-02-20 ENCOUNTER — TRANSCRIPTION ENCOUNTER (OUTPATIENT)
Age: 89
End: 2024-02-20

## 2024-02-26 ENCOUNTER — TRANSCRIPTION ENCOUNTER (OUTPATIENT)
Age: 89
End: 2024-02-26

## 2024-02-26 RX ORDER — LOSARTAN POTASSIUM 100 MG/1
100 TABLET, FILM COATED ORAL DAILY
Qty: 90 | Refills: 3 | Status: ACTIVE | COMMUNITY
Start: 2017-07-27 | End: 1900-01-01

## 2024-05-10 ENCOUNTER — APPOINTMENT (OUTPATIENT)
Dept: INTERNAL MEDICINE | Facility: CLINIC | Age: 89
End: 2024-05-10
Payer: MEDICARE

## 2024-05-10 VITALS — DIASTOLIC BLOOD PRESSURE: 60 MMHG | SYSTOLIC BLOOD PRESSURE: 110 MMHG

## 2024-05-10 DIAGNOSIS — E53.8 DEFICIENCY OF OTHER SPECIFIED B GROUP VITAMINS: ICD-10-CM

## 2024-05-10 DIAGNOSIS — E78.5 HYPERLIPIDEMIA, UNSPECIFIED: ICD-10-CM

## 2024-05-10 DIAGNOSIS — I10 ESSENTIAL (PRIMARY) HYPERTENSION: ICD-10-CM

## 2024-05-10 DIAGNOSIS — F41.8 OTHER SPECIFIED ANXIETY DISORDERS: ICD-10-CM

## 2024-05-10 DIAGNOSIS — N32.81 OVERACTIVE BLADDER: ICD-10-CM

## 2024-05-10 DIAGNOSIS — R41.89 OTHER SYMPTOMS AND SIGNS INVOLVING COGNITIVE FUNCTIONS AND AWARENESS: ICD-10-CM

## 2024-05-10 PROCEDURE — 99214 OFFICE O/P EST MOD 30 MIN: CPT

## 2024-05-10 PROCEDURE — G2211 COMPLEX E/M VISIT ADD ON: CPT

## 2024-05-10 RX ORDER — ATORVASTATIN CALCIUM 10 MG/1
10 TABLET, FILM COATED ORAL
Qty: 40 | Refills: 3 | Status: DISCONTINUED | COMMUNITY
Start: 2021-06-08 | End: 2024-05-10

## 2024-05-10 RX ORDER — ACETAMINOPHEN 500 MG/1
500 TABLET, COATED ORAL 3 TIMES DAILY
Qty: 100 | Refills: 1 | Status: COMPLETED | OUTPATIENT
Start: 2017-07-27 | End: 2024-05-10

## 2024-05-10 NOTE — HISTORY OF PRESENT ILLNESS
[Home] : at home, [unfilled] , at the time of the visit. [Medical Office: ([facility])___] : at the medical office located in  [Other:____] : [unfilled] [FreeTextEntry3] : Daughter and son in law, Daniel Perea [FreeTextEntry1] : Pt's daughter arranged for this telehealth encounter for f/u on her HTN & HLD. [de-identified] : Pt lives at an assisted living facility and her dementia has slowly worsened over the past year.  She has a  especially at night.  She is less verbal now and not able to communicate like before, she repeats herself and speech is mumbled.  According to her family, she is sleeping well, and has good bowel and urinary function.  She is still getting PT 1-2 x per week, and ambulates a few steps with walker and assistance.  She is better in the morning, but at night, when she has to uses the bathroom, she could not even help with transfers herself and is a lot weaker.  Her family would like to reduce the number of meds, she is on Atorvastatin only 3x per week, will d/c meds now.  Pt has been on Myrbetriq and seems to be doing ok, we will also try to 1/2 the dose as a trial and see what happens.  Pt does not have any bed sores.  She has not been sick in the last few months.  Pt complaint to family a couple of days ago that she could not but seems to be hearing better today according to family.  Her son in law who's a retired physician examined her and found cerumen but they are not impacted.

## 2024-05-10 NOTE — PHYSICAL EXAM
[No Acute Distress] : no acute distress [No Respiratory Distress] : no respiratory distress  [de-identified] : Elderly female in wheelchair, mumbling a lot and was not able to answer simple question, exam limited due to telehealth encounter, [de-identified] : Awake, alert, no able to converse,

## 2024-05-13 ENCOUNTER — NON-APPOINTMENT (OUTPATIENT)
Age: 89
End: 2024-05-13

## 2024-05-13 ENCOUNTER — TRANSCRIPTION ENCOUNTER (OUTPATIENT)
Age: 89
End: 2024-05-13

## 2024-05-20 ENCOUNTER — NON-APPOINTMENT (OUTPATIENT)
Age: 89
End: 2024-05-20

## 2024-05-30 ENCOUNTER — TRANSCRIPTION ENCOUNTER (OUTPATIENT)
Age: 89
End: 2024-05-30

## 2024-05-30 RX ORDER — DILTIAZEM HYDROCHLORIDE 120 MG/1
120 CAPSULE, EXTENDED RELEASE ORAL
Qty: 180 | Refills: 3 | Status: ACTIVE | COMMUNITY
Start: 2019-08-12 | End: 1900-01-01

## 2024-07-25 ENCOUNTER — TRANSCRIPTION ENCOUNTER (OUTPATIENT)
Age: 89
End: 2024-07-25

## 2024-07-26 ENCOUNTER — TRANSCRIPTION ENCOUNTER (OUTPATIENT)
Age: 89
End: 2024-07-26

## 2024-10-02 NOTE — HEALTH RISK ASSESSMENT
[Good] : ~his/her~ current health as good [Very Good] : ~his/her~  mood as very good [No falls in past year] : Patient reported no falls in the past year [0] : 2) Feeling down, depressed, or hopeless: Not at all (0) [Change in Activity] : change in activity [Fever Above 102] : no fever [None] : None [Malaise] : no malaise [Retired] : retired [College] : College [Rash] : no rash [Itching] : no itching [] :  [# Of Children ___] : has [unfilled] children [Eye Pain] : no eye pain [Feels Safe at Home] : Feels safe at home [Redness] : no redness [Fully functional (bathing, dressing, toileting, transferring, walking, feeding)] : Fully functional (bathing, dressing, toileting, transferring, walking, feeding) [Nasal Stuffiness] : no nasal congestion [Smoke Detector] : smoke detector [Sore Throat] : no sore throat [Carbon Monoxide Detector] : carbon monoxide detector [Heart Problems] : no heart problems [Murmur] : no murmur [Seat Belt] :  uses seat belt [Wheezing] : no wheezing [With Patient/Caregiver] : With Patient/Caregiver [Cough] : no cough [Designated Healthcare Proxy] : Designated healthcare proxy [Asthma] : no asthma [] : No [de-identified] : 6 days a week [Vomiting] : no vomiting [Diarrhea] : no diarrhea [EyeExamDate] : 10/2018 [Change in mental status noted] : No change in mental status noted [Constipation] : no constipation [Kidney Infection] : no kidney infection [Reports changes in hearing] : Reports no changes in hearing [Bladder Infection] : no bladder infection [Reports changes in vision] : Reports no changes in vision [Limping] : no limping [Reports changes in dental health] : Reports no changes in dental health [MammogramDate] : 11/2018 [Sleep Disturbances] : ~T no sleep disturbances [MammogramComments] : US breast - 11/2018 [PapSmearDate] : a few years ago [BoneDensityDate] : 11/2018 [ColonoscopyDate] : 1/2006 [de-identified] : Lives in a assisted living facility, [de-identified] : dentist - 2/2019 [AdvancecareDate] : 4/2019

## 2024-10-04 ENCOUNTER — RX RENEWAL (OUTPATIENT)
Age: 89
End: 2024-10-04

## 2025-01-06 ENCOUNTER — RX RENEWAL (OUTPATIENT)
Age: 89
End: 2025-01-06

## 2025-01-27 ENCOUNTER — RX RENEWAL (OUTPATIENT)
Age: 89
End: 2025-01-27

## 2025-02-03 ENCOUNTER — TRANSCRIPTION ENCOUNTER (OUTPATIENT)
Age: 89
End: 2025-02-03

## 2025-02-04 ENCOUNTER — RX RENEWAL (OUTPATIENT)
Age: 89
End: 2025-02-04

## 2025-03-11 ENCOUNTER — RX RENEWAL (OUTPATIENT)
Age: 89
End: 2025-03-11

## 2025-05-01 ENCOUNTER — NON-APPOINTMENT (OUTPATIENT)
Age: 89
End: 2025-05-01

## 2025-07-25 ENCOUNTER — RX RENEWAL (OUTPATIENT)
Age: 89
End: 2025-07-25

## 2025-08-01 ENCOUNTER — RX RENEWAL (OUTPATIENT)
Age: 89
End: 2025-08-01